# Patient Record
Sex: FEMALE | Race: WHITE | NOT HISPANIC OR LATINO | Employment: UNEMPLOYED | URBAN - METROPOLITAN AREA
[De-identification: names, ages, dates, MRNs, and addresses within clinical notes are randomized per-mention and may not be internally consistent; named-entity substitution may affect disease eponyms.]

---

## 2022-10-25 ENCOUNTER — APPOINTMENT (OUTPATIENT)
Dept: GENERAL RADIOLOGY | Facility: HOSPITAL | Age: 27
End: 2022-10-25

## 2022-10-25 ENCOUNTER — HOSPITAL ENCOUNTER (EMERGENCY)
Facility: HOSPITAL | Age: 27
Discharge: HOME | End: 2022-10-26
Attending: EMERGENCY MEDICINE | Admitting: EMERGENCY MEDICINE

## 2022-10-25 DIAGNOSIS — N39.0 ACUTE UTI: ICD-10-CM

## 2022-10-25 DIAGNOSIS — R07.9 CHEST PAIN, UNSPECIFIED TYPE: Primary | ICD-10-CM

## 2022-10-25 LAB
B-HCG UR QL: NEGATIVE
BASOPHILS # BLD AUTO: 0 10*3/MM3 (ref 0–0.2)
BASOPHILS NFR BLD AUTO: 0.4 % (ref 0–1.5)
DEPRECATED RDW RBC AUTO: 39.8 FL (ref 37–54)
EOSINOPHIL # BLD AUTO: 0.1 10*3/MM3 (ref 0–0.4)
EOSINOPHIL NFR BLD AUTO: 1.3 % (ref 0.3–6.2)
ERYTHROCYTE [DISTWIDTH] IN BLOOD BY AUTOMATED COUNT: 12.6 % (ref 12.3–15.4)
HCT VFR BLD AUTO: 38.7 % (ref 34–46.6)
HGB BLD-MCNC: 12.8 G/DL (ref 12–15.9)
LYMPHOCYTES # BLD AUTO: 2.5 10*3/MM3 (ref 0.7–3.1)
LYMPHOCYTES NFR BLD AUTO: 30.6 % (ref 19.6–45.3)
MCH RBC QN AUTO: 30.1 PG (ref 26.6–33)
MCHC RBC AUTO-ENTMCNC: 33.2 G/DL (ref 31.5–35.7)
MCV RBC AUTO: 90.6 FL (ref 79–97)
MONOCYTES # BLD AUTO: 0.7 10*3/MM3 (ref 0.1–0.9)
MONOCYTES NFR BLD AUTO: 8.6 % (ref 5–12)
NEUTROPHILS NFR BLD AUTO: 4.9 10*3/MM3 (ref 1.7–7)
NEUTROPHILS NFR BLD AUTO: 59.1 % (ref 42.7–76)
NRBC BLD AUTO-RTO: 0.1 /100 WBC (ref 0–0.2)
PLATELET # BLD AUTO: 259 10*3/MM3 (ref 140–450)
PMV BLD AUTO: 8.4 FL (ref 6–12)
RBC # BLD AUTO: 4.27 10*6/MM3 (ref 3.77–5.28)
WBC NRBC COR # BLD: 8.3 10*3/MM3 (ref 3.4–10.8)

## 2022-10-25 PROCEDURE — 81025 URINE PREGNANCY TEST: CPT | Performed by: NURSE PRACTITIONER

## 2022-10-25 PROCEDURE — 99284 EMERGENCY DEPT VISIT MOD MDM: CPT

## 2022-10-25 PROCEDURE — 84484 ASSAY OF TROPONIN QUANT: CPT | Performed by: NURSE PRACTITIONER

## 2022-10-25 PROCEDURE — 36415 COLL VENOUS BLD VENIPUNCTURE: CPT

## 2022-10-25 PROCEDURE — 93005 ELECTROCARDIOGRAM TRACING: CPT | Performed by: EMERGENCY MEDICINE

## 2022-10-25 PROCEDURE — 85025 COMPLETE CBC W/AUTO DIFF WBC: CPT | Performed by: NURSE PRACTITIONER

## 2022-10-25 PROCEDURE — 71045 X-RAY EXAM CHEST 1 VIEW: CPT

## 2022-10-25 PROCEDURE — 81001 URINALYSIS AUTO W/SCOPE: CPT | Performed by: NURSE PRACTITIONER

## 2022-10-25 PROCEDURE — 93005 ELECTROCARDIOGRAM TRACING: CPT

## 2022-10-25 PROCEDURE — 87086 URINE CULTURE/COLONY COUNT: CPT | Performed by: NURSE PRACTITIONER

## 2022-10-25 PROCEDURE — 83880 ASSAY OF NATRIURETIC PEPTIDE: CPT | Performed by: NURSE PRACTITIONER

## 2022-10-25 PROCEDURE — 80053 COMPREHEN METABOLIC PANEL: CPT | Performed by: NURSE PRACTITIONER

## 2022-10-25 RX ORDER — SODIUM CHLORIDE 0.9 % (FLUSH) 0.9 %
10 SYRINGE (ML) INJECTION AS NEEDED
Status: DISCONTINUED | OUTPATIENT
Start: 2022-10-25 | End: 2022-10-26 | Stop reason: HOSPADM

## 2022-10-26 VITALS
TEMPERATURE: 98 F | RESPIRATION RATE: 18 BRPM | SYSTOLIC BLOOD PRESSURE: 124 MMHG | HEART RATE: 67 BPM | OXYGEN SATURATION: 100 % | BODY MASS INDEX: 29.1 KG/M2 | WEIGHT: 185.41 LBS | HEIGHT: 67 IN | DIASTOLIC BLOOD PRESSURE: 94 MMHG

## 2022-10-26 LAB
ALBUMIN SERPL-MCNC: 3.9 G/DL (ref 3.5–5.2)
ALBUMIN/GLOB SERPL: 1.4 G/DL
ALP SERPL-CCNC: 78 U/L (ref 39–117)
ALT SERPL W P-5'-P-CCNC: 9 U/L (ref 1–33)
ANION GAP SERPL CALCULATED.3IONS-SCNC: 10 MMOL/L (ref 5–15)
AST SERPL-CCNC: 11 U/L (ref 1–32)
BACTERIA UR QL AUTO: ABNORMAL /HPF
BILIRUB SERPL-MCNC: 0.2 MG/DL (ref 0–1.2)
BILIRUB UR QL STRIP: NEGATIVE
BUN SERPL-MCNC: 10 MG/DL (ref 6–20)
BUN/CREAT SERPL: 14.9 (ref 7–25)
CALCIUM SPEC-SCNC: 10.1 MG/DL (ref 8.6–10.5)
CHLORIDE SERPL-SCNC: 105 MMOL/L (ref 98–107)
CLARITY UR: CLEAR
CO2 SERPL-SCNC: 27 MMOL/L (ref 22–29)
COLOR UR: YELLOW
CREAT SERPL-MCNC: 0.67 MG/DL (ref 0.57–1)
EGFRCR SERPLBLD CKD-EPI 2021: 123.8 ML/MIN/1.73
GLOBULIN UR ELPH-MCNC: 2.7 GM/DL
GLUCOSE SERPL-MCNC: 91 MG/DL (ref 65–99)
GLUCOSE UR STRIP-MCNC: NEGATIVE MG/DL
HGB UR QL STRIP.AUTO: NEGATIVE
HYALINE CASTS UR QL AUTO: ABNORMAL /LPF
KETONES UR QL STRIP: NEGATIVE
LEUKOCYTE ESTERASE UR QL STRIP.AUTO: ABNORMAL
NITRITE UR QL STRIP: NEGATIVE
NT-PROBNP SERPL-MCNC: 34.7 PG/ML (ref 0–450)
PH UR STRIP.AUTO: 6.5 [PH] (ref 5–8)
POTASSIUM SERPL-SCNC: 3.8 MMOL/L (ref 3.5–5.2)
PROT SERPL-MCNC: 6.6 G/DL (ref 6–8.5)
PROT UR QL STRIP: NEGATIVE
RBC # UR STRIP: ABNORMAL /HPF
REF LAB TEST METHOD: ABNORMAL
SODIUM SERPL-SCNC: 142 MMOL/L (ref 136–145)
SP GR UR STRIP: 1.02 (ref 1–1.03)
SQUAMOUS #/AREA URNS HPF: ABNORMAL /HPF
TROPONIN T SERPL-MCNC: <0.01 NG/ML (ref 0–0.03)
UROBILINOGEN UR QL STRIP: ABNORMAL
WBC # UR STRIP: ABNORMAL /HPF

## 2022-10-26 RX ORDER — NAPROXEN 500 MG/1
500 TABLET ORAL 2 TIMES DAILY PRN
Qty: 10 TABLET | Refills: 0 | Status: SHIPPED | OUTPATIENT
Start: 2022-10-26 | End: 2023-02-06

## 2022-10-26 RX ORDER — CEFDINIR 300 MG/1
300 CAPSULE ORAL 2 TIMES DAILY
Qty: 10 CAPSULE | Refills: 0 | Status: SHIPPED | OUTPATIENT
Start: 2022-10-26 | End: 2022-10-31

## 2022-10-27 LAB — BACTERIA SPEC AEROBE CULT: NO GROWTH

## 2022-10-28 LAB — QT INTERVAL: 407 MS

## 2023-02-06 ENCOUNTER — OFFICE VISIT (OUTPATIENT)
Dept: FAMILY MEDICINE CLINIC | Age: 28
End: 2023-02-06
Payer: MEDICAID

## 2023-02-06 VITALS
RESPIRATION RATE: 16 BRPM | TEMPERATURE: 97.5 F | DIASTOLIC BLOOD PRESSURE: 107 MMHG | BODY MASS INDEX: 29.6 KG/M2 | HEART RATE: 87 BPM | WEIGHT: 188.6 LBS | HEIGHT: 67 IN | SYSTOLIC BLOOD PRESSURE: 139 MMHG | OXYGEN SATURATION: 99 %

## 2023-02-06 DIAGNOSIS — Z79.899 CHRONIC PRESCRIPTION BENZODIAZEPINE USE: ICD-10-CM

## 2023-02-06 DIAGNOSIS — F41.1 GENERALIZED ANXIETY DISORDER WITH PANIC ATTACKS: ICD-10-CM

## 2023-02-06 DIAGNOSIS — M79.604 BILATERAL LEG PAIN: ICD-10-CM

## 2023-02-06 DIAGNOSIS — E55.9 VITAMIN D DEFICIENCY: ICD-10-CM

## 2023-02-06 DIAGNOSIS — M79.605 BILATERAL LEG PAIN: ICD-10-CM

## 2023-02-06 DIAGNOSIS — M54.42 CHRONIC MIDLINE LOW BACK PAIN WITH BILATERAL SCIATICA: ICD-10-CM

## 2023-02-06 DIAGNOSIS — F90.2 ADHD (ATTENTION DEFICIT HYPERACTIVITY DISORDER), COMBINED TYPE: ICD-10-CM

## 2023-02-06 DIAGNOSIS — N39.0 ACUTE UTI: ICD-10-CM

## 2023-02-06 DIAGNOSIS — Z79.899 MEDICATION MANAGEMENT: ICD-10-CM

## 2023-02-06 DIAGNOSIS — N92.6 IRREGULAR MENSTRUATION: ICD-10-CM

## 2023-02-06 DIAGNOSIS — E53.8 VITAMIN B12 DEFICIENCY: ICD-10-CM

## 2023-02-06 DIAGNOSIS — G47.9 SLEEP DISTURBANCE: ICD-10-CM

## 2023-02-06 DIAGNOSIS — Z76.89 ENCOUNTER FOR ASSESSMENT OF STD EXPOSURE: ICD-10-CM

## 2023-02-06 DIAGNOSIS — F41.8 DEPRESSION WITH ANXIETY: ICD-10-CM

## 2023-02-06 DIAGNOSIS — Z00.00 ENCOUNTER FOR MEDICAL EXAMINATION TO ESTABLISH CARE: Primary | ICD-10-CM

## 2023-02-06 DIAGNOSIS — F41.0 GENERALIZED ANXIETY DISORDER WITH PANIC ATTACKS: ICD-10-CM

## 2023-02-06 DIAGNOSIS — R30.0 DYSURIA: ICD-10-CM

## 2023-02-06 DIAGNOSIS — D50.8 IRON DEFICIENCY ANEMIA SECONDARY TO INADEQUATE DIETARY IRON INTAKE: ICD-10-CM

## 2023-02-06 DIAGNOSIS — B37.31 VAGINAL YEAST INFECTION: ICD-10-CM

## 2023-02-06 DIAGNOSIS — Z11.59 NEED FOR HEPATITIS C SCREENING TEST: ICD-10-CM

## 2023-02-06 DIAGNOSIS — Z76.0 MEDICATION REFILL: ICD-10-CM

## 2023-02-06 DIAGNOSIS — G89.29 CHRONIC MIDLINE LOW BACK PAIN WITH BILATERAL SCIATICA: ICD-10-CM

## 2023-02-06 DIAGNOSIS — Z79.899 ENCOUNTER FOR MEDICATION REVIEW: ICD-10-CM

## 2023-02-06 DIAGNOSIS — R03.0 ELEVATED BLOOD PRESSURE READING WITHOUT DIAGNOSIS OF HYPERTENSION: ICD-10-CM

## 2023-02-06 DIAGNOSIS — M54.41 CHRONIC MIDLINE LOW BACK PAIN WITH BILATERAL SCIATICA: ICD-10-CM

## 2023-02-06 PROBLEM — D50.9 IRON DEFICIENCY ANEMIA: Status: ACTIVE | Noted: 2023-02-06

## 2023-02-06 PROBLEM — J45.20 INTERMITTENT ASTHMA: Status: ACTIVE | Noted: 2023-02-06

## 2023-02-06 LAB
B-HCG UR QL: NEGATIVE
BILIRUB BLD-MCNC: NEGATIVE MG/DL
C TRACH RRNA CVX QL NAA+PROBE: NOT DETECTED
CLARITY, POC: ABNORMAL
COLOR UR: YELLOW
EXPIRATION DATE: NORMAL
GLUCOSE UR STRIP-MCNC: NEGATIVE MG/DL
INTERNAL NEGATIVE CONTROL: NORMAL
INTERNAL POSITIVE CONTROL: NORMAL
KETONES UR QL: NEGATIVE
LEUKOCYTE EST, POC: NEGATIVE
Lab: NORMAL
N GONORRHOEA RRNA SPEC QL NAA+PROBE: NOT DETECTED
NITRITE UR-MCNC: NEGATIVE MG/ML
PH UR: 7 [PH] (ref 5–8)
PROT UR STRIP-MCNC: NEGATIVE MG/DL
RBC # UR STRIP: NEGATIVE /UL
SP GR UR: 1.01 (ref 1–1.03)
UROBILINOGEN UR QL: ABNORMAL

## 2023-02-06 PROCEDURE — 99204 OFFICE O/P NEW MOD 45 MIN: CPT | Performed by: NURSE PRACTITIONER

## 2023-02-06 PROCEDURE — 81025 URINE PREGNANCY TEST: CPT | Performed by: NURSE PRACTITIONER

## 2023-02-06 PROCEDURE — 87661 TRICHOMONAS VAGINALIS AMPLIF: CPT | Performed by: NURSE PRACTITIONER

## 2023-02-06 PROCEDURE — 87591 N.GONORRHOEAE DNA AMP PROB: CPT | Performed by: NURSE PRACTITIONER

## 2023-02-06 PROCEDURE — 87491 CHLMYD TRACH DNA AMP PROBE: CPT | Performed by: NURSE PRACTITIONER

## 2023-02-06 PROCEDURE — 87086 URINE CULTURE/COLONY COUNT: CPT | Performed by: NURSE PRACTITIONER

## 2023-02-06 RX ORDER — LISDEXAMFETAMINE DIMESYLATE 50 MG
50 CAPSULE ORAL EVERY MORNING
COMMUNITY
Start: 2023-01-11 | End: 2023-02-09 | Stop reason: SDUPTHER

## 2023-02-06 RX ORDER — SULFAMETHOXAZOLE AND TRIMETHOPRIM 800; 160 MG/1; MG/1
1 TABLET ORAL 2 TIMES DAILY
Qty: 10 TABLET | Refills: 0 | Status: SHIPPED | OUTPATIENT
Start: 2023-02-06 | End: 2023-02-06 | Stop reason: SDUPTHER

## 2023-02-06 RX ORDER — ALPRAZOLAM 0.5 MG/1
1 TABLET ORAL EVERY 12 HOURS SCHEDULED
COMMUNITY
Start: 2023-01-11 | End: 2023-02-09 | Stop reason: SDUPTHER

## 2023-02-06 RX ORDER — HYDROCODONE BITARTRATE AND ACETAMINOPHEN 5; 325 MG/1; MG/1
1 TABLET ORAL EVERY 6 HOURS PRN
COMMUNITY
Start: 2023-02-01 | End: 2023-03-06

## 2023-02-06 RX ORDER — FOLIC ACID 1 MG/1
1 TABLET ORAL DAILY
COMMUNITY
Start: 2022-12-19 | End: 2023-03-06 | Stop reason: SDUPTHER

## 2023-02-06 RX ORDER — MONTELUKAST SODIUM 10 MG/1
1 TABLET ORAL DAILY
COMMUNITY
Start: 2022-12-19 | End: 2023-03-06 | Stop reason: SDUPTHER

## 2023-02-06 RX ORDER — AMOXICILLIN 500 MG/1
1 CAPSULE ORAL 3 TIMES DAILY
COMMUNITY
Start: 2023-02-01 | End: 2023-03-06

## 2023-02-06 RX ORDER — FLUCONAZOLE 150 MG/1
TABLET ORAL
Qty: 2 TABLET | Refills: 0 | Status: SHIPPED | OUTPATIENT
Start: 2023-02-06 | End: 2023-03-06

## 2023-02-06 RX ORDER — LANOLIN ALCOHOL/MO/W.PET/CERES
1 CREAM (GRAM) TOPICAL EVERY 12 HOURS SCHEDULED
COMMUNITY
Start: 2022-12-19 | End: 2023-03-06 | Stop reason: SDUPTHER

## 2023-02-06 RX ORDER — ESCITALOPRAM OXALATE 5 MG/1
5 TABLET ORAL DAILY
Qty: 30 TABLET | Refills: 0 | Status: SHIPPED | OUTPATIENT
Start: 2023-02-06 | End: 2023-03-06 | Stop reason: SDUPTHER

## 2023-02-06 RX ORDER — CHOLECALCIFEROL (VITAMIN D3) 1250 MCG
CAPSULE ORAL
COMMUNITY
Start: 2022-12-19 | End: 2023-02-09 | Stop reason: SDUPTHER

## 2023-02-06 RX ORDER — IBUPROFEN 800 MG/1
800 TABLET ORAL EVERY 8 HOURS PRN
COMMUNITY
Start: 2023-02-01 | End: 2023-03-06

## 2023-02-06 RX ORDER — ALBUTEROL SULFATE 90 UG/1
2 AEROSOL, METERED RESPIRATORY (INHALATION) EVERY 4 HOURS PRN
Qty: 18 G | Refills: 2 | Status: SHIPPED | OUTPATIENT
Start: 2023-02-06

## 2023-02-06 RX ORDER — LAMOTRIGINE 25 MG/1
1 TABLET ORAL EVERY 12 HOURS SCHEDULED
COMMUNITY
Start: 2022-12-19

## 2023-02-06 RX ORDER — CYCLOBENZAPRINE HCL 10 MG
10 TABLET ORAL 3 TIMES DAILY
COMMUNITY
Start: 2022-12-19

## 2023-02-06 RX ORDER — ALBUTEROL SULFATE 90 UG/1
AEROSOL, METERED RESPIRATORY (INHALATION)
COMMUNITY
Start: 2022-12-19 | End: 2023-02-06 | Stop reason: SDUPTHER

## 2023-02-06 RX ORDER — HYDROXYZINE HYDROCHLORIDE 25 MG/1
25 TABLET, FILM COATED ORAL NIGHTLY PRN
Qty: 30 TABLET | Refills: 0 | Status: SHIPPED | OUTPATIENT
Start: 2023-02-06 | End: 2023-03-06 | Stop reason: SDUPTHER

## 2023-02-06 NOTE — PROGRESS NOTES
Deanna Bah  [id number]  1995  female     02/06/2023      Chief Complaint  Establish Care (Depression /Hasnt had blood work in a long time, pt is not fasting ) and Difficulty Urinating    History of Present Illness  27 year old female patient presents today to establish care.  Patient complains of dysuria and occasional white vaginal discharge x1 week.  Patient states she was placed on an amoxicillin antibiotic for an infected tooth roughly 1 week ago and that her vaginal discharge started getting worse after starting Amoxil antibiotic.  Patient states she has a history of a vaginal yeast infection and contributes this to that.  Patient states she would like to be STD tested for potential STD exposure.  Patient states she was last sexually active 4 days ago with the same male partner.  Patient denies fever, chills, body aches, abdominal pain, NVD, pelvic pain, hematuria, urinary frequency, urinary urgency, flank pain, back pain.  Patient states she contributes her blood pressure elevation due to her anxiety.  Patient is here to discuss her depression.  Patient states she does not have trouble falling asleep but has been having trouble staying asleep.  Patient states she is on Xanax 0.5 mg twice daily.  Reviewed with patient [place] policy on controlled substances such as her Xanax and that she will have to come in for refills.  Reviewed patient's PHQ-2/PHQ-9 and patient denies suicidal ideation or intent of self-harm at this time.  Patient denies agitation, hallucinations, delusions.  Patient states she has been on Vyvanse for her ADHD.  Patient states she has history of intermittent asthma where she uses her albuterol inhaler, patient states she needs a med refill on this.  Patient states she is on vitamin D for vitamin D deficiency and has a history of vitamin B12 deficiency.  Patient states she was prescribed Norco's as needed for her chronic midline low back pain with sciatica but denies lumbar  imaging.  Patient states she has not been taking Norco due to it causing her to have an upset stomach/nausea.  Patient states she takes folic acid and ferrous sulfate for iron deficiency anemia.  Patient agrees to nurse/MA visit to come in fasting for labs 1 day this week.  Patient agrees to try Lexapro and hydroxyzine as needed for her anxiety and sleep problems and to follow-up in 1 month for recheck.  Patient agrees to lumbar x-ray and pain management referral for chronic pain management and medication management.  Patient also agrees to behavioral health referral to [facility] for her anxiety, depression, ADHD management.      Review of Systems   Constitutional: Negative.    HENT: Negative.    Eyes: Negative.    Respiratory: Negative.    Cardiovascular: Negative.    Gastrointestinal: Negative.    Endocrine: Negative.    Genitourinary: Positive for dysuria and vaginal discharge (occasionally white). Negative for decreased urine volume, difficulty urinating, dyspareunia, enuresis, flank pain, frequency, genital sores, hematuria, menstrual problem, pelvic pain, urgency, vaginal bleeding and vaginal pain.   Musculoskeletal: Negative.    Skin: Negative.    Allergic/Immunologic: Negative.    Neurological: Negative.    Hematological: Negative.    Psychiatric/Behavioral: Negative.        Past Medical History:   Diagnosis Date   • Anxiety    • Asthma    • Bipolar 1 disorder (HCC)    • Hypertension    • Lordoscoliosis        Past Surgical History:   Procedure Laterality Date   • KIDNEY STONE SURGERY     • MOUTH SURGERY         Family History   Problem Relation Age of Onset   • Hypertension Mother    • Diabetes Mother    • Liver cancer Father    • Diabetes Sister    • Liver cancer Maternal Grandmother    • Aneurysm Maternal Grandfather    • Breast cancer Paternal Grandmother        Social History     Socioeconomic History   • Marital status: Single   Tobacco Use   • Smoking status: Never   • Smokeless tobacco: Never  "  Vaping Use   • Vaping Use: Every day   • Substances: Flavoring   • Devices: Disposable   Substance and Sexual Activity   • Alcohol use: Never   • Sexual activity: Yes     Partners: Male        Allergies   Allergen Reactions   • Latex Rash         Objective   Vital Signs:   BP (!) 139/107 (BP Location: Left arm, Patient Position: Sitting, Cuff Size: Adult)   Pulse 87   Temp 97.5 °F (36.4 °C) (Temporal)   Resp 16   Ht 170.2 cm (67\")   Wt 85.5 kg (188 lb 9.6 oz)   SpO2 99%   BMI 29.54 kg/m²       Physical Exam  Vitals and nursing note reviewed.   Constitutional:       General: She is not in acute distress.     Appearance: Normal appearance. She is not ill-appearing, toxic-appearing or diaphoretic.   HENT:      Head: Normocephalic and atraumatic.      Jaw: There is normal jaw occlusion.      Right Ear: Hearing and external ear normal.      Left Ear: Hearing and external ear normal.      Nose: Nose normal.      Mouth/Throat:      Lips: Pink.   Eyes:      General: Lids are normal. Vision grossly intact. Gaze aligned appropriately.      Extraocular Movements: Extraocular movements intact.      Conjunctiva/sclera: Conjunctivae normal.      Pupils: Pupils are equal, round, and reactive to light.   Cardiovascular:      Rate and Rhythm: Normal rate and regular rhythm.      Pulses: Normal pulses.           Carotid pulses are 2+ on the right side and 2+ on the left side.       Radial pulses are 2+ on the right side and 2+ on the left side.        Dorsalis pedis pulses are 2+ on the right side and 2+ on the left side.        Posterior tibial pulses are 2+ on the right side and 2+ on the left side.      Heart sounds: Normal heart sounds, S1 normal and S2 normal. No murmur heard.  Pulmonary:      Effort: Pulmonary effort is normal.      Breath sounds: Normal breath sounds and air entry.   Abdominal:      General: Abdomen is flat. Bowel sounds are normal. There is no distension or abdominal bruit.      Palpations: Abdomen " is soft.      Tenderness: There is no abdominal tenderness.   Musculoskeletal:         General: Normal range of motion.      Cervical back: Full passive range of motion without pain, normal range of motion and neck supple.      Right lower leg: No edema.      Left lower leg: No edema.   Skin:     General: Skin is warm and dry.      Capillary Refill: Capillary refill takes less than 2 seconds.      Coloration: Skin is not cyanotic or pale.      Findings: No bruising, erythema or rash.   Neurological:      General: No focal deficit present.      Mental Status: She is alert and oriented to person, place, and time. Mental status is at baseline.      GCS: GCS eye subscore is 4. GCS verbal subscore is 5. GCS motor subscore is 6.      Cranial Nerves: Cranial nerves 2-12 are intact. No cranial nerve deficit.      Sensory: Sensation is intact. No sensory deficit.      Motor: Motor function is intact. No weakness.      Coordination: Coordination is intact. Coordination normal.      Gait: Gait is intact. Gait normal.      Deep Tendon Reflexes: Reflexes normal.      Comments: Alert and oriented x3, no acute distress.  Answers questions appropriately and in a timely manner.   Psychiatric:         Attention and Perception: Attention and perception normal.         Mood and Affect: Mood and affect normal.         Speech: Speech normal.         Behavior: Behavior normal. Behavior is cooperative.         Thought Content: Thought content normal.         Cognition and Memory: Cognition and memory normal.         Judgment: Judgment normal.                 Assessment and Plan   Diagnoses and all orders for this visit:    1. Encounter for medical examination to establish care (Primary)  -     Vitamin B12; Future  -     Vitamin D 25 hydroxy; Future  -     CBC w AUTO Differential; Future  -     Comprehensive metabolic panel; Future  -     TSH; Future  -     Hepatitis C antibody; Future    2. Dysuria  -     POCT urinalysis dipstick,  multipro  -     Urine Culture - Urine, Urine, Clean Catch  -     Chlamydia trachomatis, Neisseria gonorrhoeae, PCR - Urine, Urine, Random Void  -     Trichomonas vaginalis, PCR - Urine, Urine, Clean Catch  -     POCT pregnancy, urine    3. Encounter for assessment of STD exposure  -     Chlamydia trachomatis, Neisseria gonorrhoeae, PCR - Urine, Urine, Random Void  -     Trichomonas vaginalis, PCR - Urine, Urine, Clean Catch    4. Irregular menstruation  -     Ambulatory Referral to Obstetrics / Gynecology  -     POCT pregnancy, urine    5. Medication refill  -     albuterol sulfate  (90 Base) MCG/ACT inhaler; Inhale 2 puffs Every 4 (Four) Hours As Needed for Wheezing.  Dispense: 18 g; Refill: 2    6. Chronic midline low back pain with bilateral sciatica  -     XR Spine Lumbar 2 or 3 View; Future  -     POCT pregnancy, urine  -     Ambulatory Referral to Pain Management    7. Bilateral leg pain  -     Ambulatory Referral to Pain Management    8. Medication management  -     Ambulatory Referral to Pain Management  -     Ambulatory Referral to Behavioral Health    9. Chronic prescription benzodiazepine use  -     Ambulatory Referral to Behavioral Health    10. Generalized anxiety disorder with panic attacks  -     Ambulatory Referral to Behavioral Health  -     escitalopram (Lexapro) 5 MG tablet; Take 1 tablet by mouth Daily.  Dispense: 30 tablet; Refill: 0  -     hydrOXYzine (ATARAX) 25 MG tablet; Take 1 tablet by mouth At Night As Needed for Anxiety for up to 30 days.  Dispense: 30 tablet; Refill: 0    11. Depression with anxiety  -     Ambulatory Referral to Behavioral Health  -     escitalopram (Lexapro) 5 MG tablet; Take 1 tablet by mouth Daily.  Dispense: 30 tablet; Refill: 0  -     TSH; Future    12. ADHD (attention deficit hyperactivity disorder), combined type  -     Ambulatory Referral to Behavioral Health    13. Sleep disturbance  -     hydrOXYzine (ATARAX) 25 MG tablet; Take 1 tablet by mouth At  Night As Needed for Anxiety for up to 30 days.  Dispense: 30 tablet; Refill: 0    14. Acute UTI  Comments:  Patient currently on Amoxicillin ABX for tooth infection. Pending urine culture.   Orders:  -     Discontinue: sulfamethoxazole-trimethoprim (Bactrim DS) 800-160 MG per tablet; Take 1 tablet by mouth 2 (Two) Times a Day for 5 days.  Dispense: 10 tablet; Refill: 0    15. Elevated blood pressure reading without diagnosis of hypertension  Comments:  Patient contributes this to her anxiety.     16. Vaginal yeast infection  -     fluconazole (Diflucan) 150 MG tablet; X1 today, repeat in 72 hours x 1 if needed  Dispense: 2 tablet; Refill: 0    17. Need for hepatitis C screening test  -     Hepatitis C antibody; Future    18. Vitamin D deficiency  -     Vitamin D 25 hydroxy; Future    19. Vitamin B12 deficiency  -     Vitamin B12; Future    20. Iron deficiency anemia secondary to inadequate dietary iron intake  -     CBC w AUTO Differential; Future    21. Encounter for medication review        Follow Up   Return in about 1 month (around 3/6/2023) for Recheck.    There are no Patient Instructions on file for this visit.    none

## 2023-02-07 LAB
BACTERIA SPEC AEROBE CULT: NO GROWTH
T VAGINALIS RRNA SPEC QL NAA+PROBE: NEGATIVE

## 2023-02-08 ENCOUNTER — CLINICAL SUPPORT (OUTPATIENT)
Dept: FAMILY MEDICINE CLINIC | Age: 28
End: 2023-02-08
Payer: MEDICAID

## 2023-02-08 ENCOUNTER — TELEPHONE (OUTPATIENT)
Dept: FAMILY MEDICINE CLINIC | Age: 28
End: 2023-02-08
Payer: MEDICAID

## 2023-02-08 DIAGNOSIS — D50.8 IRON DEFICIENCY ANEMIA SECONDARY TO INADEQUATE DIETARY IRON INTAKE: ICD-10-CM

## 2023-02-08 DIAGNOSIS — Z11.59 NEED FOR HEPATITIS C SCREENING TEST: ICD-10-CM

## 2023-02-08 DIAGNOSIS — E53.8 VITAMIN B12 DEFICIENCY: ICD-10-CM

## 2023-02-08 DIAGNOSIS — F41.8 DEPRESSION WITH ANXIETY: ICD-10-CM

## 2023-02-08 DIAGNOSIS — Z00.00 ENCOUNTER FOR MEDICAL EXAMINATION TO ESTABLISH CARE: ICD-10-CM

## 2023-02-08 DIAGNOSIS — E55.9 VITAMIN D DEFICIENCY: ICD-10-CM

## 2023-02-08 PROCEDURE — 80050 GENERAL HEALTH PANEL: CPT | Performed by: NURSE PRACTITIONER

## 2023-02-08 PROCEDURE — 36415 COLL VENOUS BLD VENIPUNCTURE: CPT | Performed by: NURSE PRACTITIONER

## 2023-02-08 PROCEDURE — 82306 VITAMIN D 25 HYDROXY: CPT | Performed by: NURSE PRACTITIONER

## 2023-02-08 PROCEDURE — 82607 VITAMIN B-12: CPT | Performed by: NURSE PRACTITIONER

## 2023-02-08 PROCEDURE — 86803 HEPATITIS C AB TEST: CPT | Performed by: NURSE PRACTITIONER

## 2023-02-08 NOTE — PROGRESS NOTES
Venipuncture Blood Specimen Collection  Venipuncture performed in right arm by Jelly Carcamo MA with good hemostasis. Patient tolerated the procedure well without complications.   02/08/23   Jelly Carcamo MA

## 2023-02-08 NOTE — TELEPHONE ENCOUNTER
"Favian, patient came in to have her lab work done. She inquired about a few things while here. First she asked about her urine lab results, I went over those with her. She voiced understanding.     She also asked if she was supposed to discontinue her other medication since she has started the lexapro 5 mg. I looked in her chart and do not see any active or inactive depression/anxiety meds. I asked her what medication she was taking before and she was not sure. Stated she would go home, check the bottle and give me a call. I did tell her that typically you would not be on two different types of depression medication, but was adimate about her calling me back so that I could confirm. She voiced understanding.     She also said that during her office visit on 2/6- you guys were \"trying to decide how to do the Vyvanse and Xanax together\". I explained that these two medication are counteractive with one being a stimulant and the other a sedative. She said that that she pays for the Xanax out of pocket and her insurance covers the Vyvanse. She wanted me to pass that on to you.    She also asked about her GYN referral and if she would need to call and schedule an appointment with them. I told her that she would indeed need to contact their office. She does not know which office you referred her to. I tried to look at the referral but I could not find any office details besides \"Obsterics and Gynecology\". Please confirm so that I can give her information to the office. Thank you!  "

## 2023-02-09 DIAGNOSIS — F90.2 ADHD (ATTENTION DEFICIT HYPERACTIVITY DISORDER), COMBINED TYPE: Primary | ICD-10-CM

## 2023-02-09 DIAGNOSIS — F41.0 PANIC ATTACKS: ICD-10-CM

## 2023-02-09 DIAGNOSIS — F41.8 DEPRESSION WITH ANXIETY: ICD-10-CM

## 2023-02-09 LAB
25(OH)D3 SERPL-MCNC: 28.5 NG/ML (ref 30–100)
ALBUMIN SERPL-MCNC: 4.5 G/DL (ref 3.5–5.2)
ALBUMIN/GLOB SERPL: 1.6 G/DL
ALP SERPL-CCNC: 74 U/L (ref 39–117)
ALT SERPL W P-5'-P-CCNC: 16 U/L (ref 1–33)
ANION GAP SERPL CALCULATED.3IONS-SCNC: 10.6 MMOL/L (ref 5–15)
AST SERPL-CCNC: 15 U/L (ref 1–32)
BASOPHILS # BLD AUTO: 0.03 10*3/MM3 (ref 0–0.2)
BASOPHILS NFR BLD AUTO: 0.4 % (ref 0–1.5)
BILIRUB SERPL-MCNC: 0.4 MG/DL (ref 0–1.2)
BUN SERPL-MCNC: 10 MG/DL (ref 6–20)
BUN/CREAT SERPL: 14.3 (ref 7–25)
CALCIUM SPEC-SCNC: 9.9 MG/DL (ref 8.6–10.5)
CHLORIDE SERPL-SCNC: 101 MMOL/L (ref 98–107)
CO2 SERPL-SCNC: 26.4 MMOL/L (ref 22–29)
CREAT SERPL-MCNC: 0.7 MG/DL (ref 0.57–1)
DEPRECATED RDW RBC AUTO: 40.3 FL (ref 37–54)
EGFRCR SERPLBLD CKD-EPI 2021: 122.5 ML/MIN/1.73
EOSINOPHIL # BLD AUTO: 0.14 10*3/MM3 (ref 0–0.4)
EOSINOPHIL NFR BLD AUTO: 2 % (ref 0.3–6.2)
ERYTHROCYTE [DISTWIDTH] IN BLOOD BY AUTOMATED COUNT: 11.8 % (ref 12.3–15.4)
GLOBULIN UR ELPH-MCNC: 2.9 GM/DL
GLUCOSE SERPL-MCNC: 87 MG/DL (ref 65–99)
HCT VFR BLD AUTO: 43.4 % (ref 34–46.6)
HCV AB SER DONR QL: NORMAL
HGB BLD-MCNC: 14.5 G/DL (ref 12–15.9)
IMM GRANULOCYTES # BLD AUTO: 0.01 10*3/MM3 (ref 0–0.05)
IMM GRANULOCYTES NFR BLD AUTO: 0.1 % (ref 0–0.5)
LYMPHOCYTES # BLD AUTO: 1.75 10*3/MM3 (ref 0.7–3.1)
LYMPHOCYTES NFR BLD AUTO: 24.7 % (ref 19.6–45.3)
MCH RBC QN AUTO: 30.9 PG (ref 26.6–33)
MCHC RBC AUTO-ENTMCNC: 33.4 G/DL (ref 31.5–35.7)
MCV RBC AUTO: 92.3 FL (ref 79–97)
MONOCYTES # BLD AUTO: 0.64 10*3/MM3 (ref 0.1–0.9)
MONOCYTES NFR BLD AUTO: 9 % (ref 5–12)
NEUTROPHILS NFR BLD AUTO: 4.51 10*3/MM3 (ref 1.7–7)
NEUTROPHILS NFR BLD AUTO: 63.8 % (ref 42.7–76)
NRBC BLD AUTO-RTO: 0 /100 WBC (ref 0–0.2)
PLATELET # BLD AUTO: 315 10*3/MM3 (ref 140–450)
PMV BLD AUTO: 10.3 FL (ref 6–12)
POTASSIUM SERPL-SCNC: 4.2 MMOL/L (ref 3.5–5.2)
PROT SERPL-MCNC: 7.4 G/DL (ref 6–8.5)
RBC # BLD AUTO: 4.7 10*6/MM3 (ref 3.77–5.28)
SODIUM SERPL-SCNC: 138 MMOL/L (ref 136–145)
TSH SERPL DL<=0.05 MIU/L-ACNC: 0.98 UIU/ML (ref 0.27–4.2)
VIT B12 BLD-MCNC: 252 PG/ML (ref 211–946)
WBC NRBC COR # BLD: 7.08 10*3/MM3 (ref 3.4–10.8)

## 2023-02-09 RX ORDER — ERGOCALCIFEROL 1.25 MG/1
50000 CAPSULE ORAL 2 TIMES WEEKLY
Qty: 8 CAPSULE | Refills: 0 | Status: SHIPPED | OUTPATIENT
Start: 2023-02-09 | End: 2023-03-07 | Stop reason: DRUGHIGH

## 2023-02-09 RX ORDER — LISDEXAMFETAMINE DIMESYLATE 50 MG
50 CAPSULE ORAL EVERY MORNING
Qty: 4 CAPSULE | Refills: 0 | Status: SHIPPED | OUTPATIENT
Start: 2023-02-09 | End: 2023-02-20 | Stop reason: SDUPTHER

## 2023-02-09 RX ORDER — ALPRAZOLAM 0.5 MG/1
0.5 TABLET ORAL EVERY 12 HOURS SCHEDULED
Qty: 8 TABLET | Refills: 0 | Status: SHIPPED | OUTPATIENT
Start: 2023-02-09 | End: 2023-02-20 | Stop reason: SDUPTHER

## 2023-02-09 RX ORDER — ALPRAZOLAM 0.5 MG/1
0.5 TABLET ORAL EVERY 12 HOURS SCHEDULED
Qty: 30 TABLET | Refills: 0 | Status: CANCELLED | OUTPATIENT
Start: 2023-02-09

## 2023-02-09 RX ORDER — LISDEXAMFETAMINE DIMESYLATE 50 MG
50 CAPSULE ORAL EVERY MORNING
Qty: 30 CAPSULE | Refills: 0 | Status: CANCELLED | OUTPATIENT
Start: 2023-02-09

## 2023-02-09 NOTE — TELEPHONE ENCOUNTER
Rx Refill Note  Requested Prescriptions      No prescriptions requested or ordered in this encounter      Last office visit with prescribing clinician: 2/6/2023   Last telemedicine visit with prescribing clinician: 3/6/2023   Next office visit with prescribing clinician: 3/6/2023       INSPECT - SCAN - INSPECT/ [facility]/ 02.09.2023 (02/09/2023)                      Would you like a call back once the refill request has been completed: [] Yes [] No    If the office needs to give you a call back, can they leave a voicemail: [] Yes [] No    Ruby Upton LPN  02/09/23, 16:44 EST

## 2023-02-09 NOTE — TELEPHONE ENCOUNTER
Please call and notify patient that she was prescribed 4 days worth of her Vyvanse and Xanax to cover her for the weekend.  Please notify patient that she will need to come in either tomorrow or first thing Monday to sign a patient controlled substance agreement form and to leave a UDS. Thanks

## 2023-02-09 NOTE — TELEPHONE ENCOUNTER
Rx Refill Note        Requested Prescriptions     Pending Prescriptions Disp Refills   • ALPRAZolam (XANAX) 0.5 MG tablet 30 tablet 0     Sig: Take 1 tablet by mouth Every 12 (Twelve) Hours.   • Vyvanse 50 MG capsule 30 capsule 0     Sig: Take 1 capsule by mouth Every Morning      Last office visit with prescribing clinician: 2/6/2023      Next office visit with prescribing clinician: 3/6/2023            Marco Forte MA  02/09/23, 16:26 EST

## 2023-02-10 NOTE — TELEPHONE ENCOUNTER
Hub is instructed to read the documentation below to patient    Tried to call patient, no answer. Left a detailed voicemail informing patient that 4 days worth of Vyvanse and Xanax were sent into the pharmacy to last her through the weekend. Informed that she needs to come in either today (Friday) or Monday to sign her controlled substance agreement and perform a urine drug screen.

## 2023-02-14 NOTE — TELEPHONE ENCOUNTER
Caller: Deanna Bah I    Relationship: Self    Best call back number: [phone]    What was the call regarding: HUB RELAYED MESSAGE TO PATIENT. PATIENT STATED THAT SHE HAD TO TAKE HER MOTHER TO THE ER YESTERDAY SO SHE WAS UNABLE TO COME IN. PATIENT STATED THAT SHE CAN COME IN TODAY. PLEASE ADVISE.    Do you require a callback: YES

## 2023-02-14 NOTE — TELEPHONE ENCOUNTER
Called and spoke to patient. She is coming in today to sign controlled substance agreement and perform UDS

## 2023-02-20 DIAGNOSIS — F41.0 PANIC ATTACKS: ICD-10-CM

## 2023-02-20 DIAGNOSIS — F90.2 ADHD (ATTENTION DEFICIT HYPERACTIVITY DISORDER), COMBINED TYPE: ICD-10-CM

## 2023-02-20 RX ORDER — ALPRAZOLAM 0.5 MG/1
0.5 TABLET ORAL EVERY 12 HOURS SCHEDULED
Qty: 60 TABLET | Refills: 0 | Status: SHIPPED | OUTPATIENT
Start: 2023-02-20

## 2023-02-20 RX ORDER — LISDEXAMFETAMINE DIMESYLATE 50 MG
50 CAPSULE ORAL EVERY MORNING
Qty: 30 CAPSULE | Refills: 0 | Status: SHIPPED | OUTPATIENT
Start: 2023-02-20

## 2023-02-20 NOTE — TELEPHONE ENCOUNTER
Rx Refill Note    PROTOCOLS NOT MET     Requested Prescriptions     Pending Prescriptions Disp Refills   • ALPRAZolam (XANAX) 0.5 MG tablet 8 tablet 0     Sig: Take 1 tablet by mouth Every 12 (Twelve) Hours.   • Vyvanse 50 MG capsule 4 capsule 0     Sig: Take 1 capsule by mouth Every Morning      Last office visit with prescribing clinician: 2/6/2023      Next office visit with prescribing clinician: 3/6/2023     PUNEET BENSON - INSPECT/[facility]/ 2-20-23 (02/20/2023)         Jelly Carcamo MA  02/20/23, 12:06 EST

## 2023-03-01 ENCOUNTER — HOSPITAL ENCOUNTER (EMERGENCY)
Facility: HOSPITAL | Age: 28
Discharge: HOME | End: 2023-03-01
Attending: EMERGENCY MEDICINE | Admitting: EMERGENCY MEDICINE
Payer: MEDICAID

## 2023-03-01 ENCOUNTER — APPOINTMENT (OUTPATIENT)
Dept: GENERAL RADIOLOGY | Facility: HOSPITAL | Age: 28
End: 2023-03-01
Payer: MEDICAID

## 2023-03-01 ENCOUNTER — APPOINTMENT (OUTPATIENT)
Dept: CT IMAGING | Facility: HOSPITAL | Age: 28
End: 2023-03-01
Payer: MEDICAID

## 2023-03-01 VITALS
RESPIRATION RATE: 18 BRPM | HEIGHT: 69 IN | WEIGHT: 187.17 LBS | SYSTOLIC BLOOD PRESSURE: 128 MMHG | HEART RATE: 76 BPM | OXYGEN SATURATION: 98 % | BODY MASS INDEX: 27.72 KG/M2 | TEMPERATURE: 98 F | DIASTOLIC BLOOD PRESSURE: 72 MMHG

## 2023-03-01 DIAGNOSIS — S39.012A STRAIN OF LUMBAR REGION, INITIAL ENCOUNTER: ICD-10-CM

## 2023-03-01 DIAGNOSIS — S20.219A CONTUSION OF CHEST WALL, UNSPECIFIED LATERALITY, INITIAL ENCOUNTER: ICD-10-CM

## 2023-03-01 DIAGNOSIS — S16.1XXA ACUTE STRAIN OF NECK MUSCLE, INITIAL ENCOUNTER: Primary | ICD-10-CM

## 2023-03-01 PROCEDURE — 72125 CT NECK SPINE W/O DYE: CPT

## 2023-03-01 PROCEDURE — 99282 EMERGENCY DEPT VISIT SF MDM: CPT

## 2023-03-01 PROCEDURE — 72110 X-RAY EXAM L-2 SPINE 4/>VWS: CPT

## 2023-03-01 PROCEDURE — 71046 X-RAY EXAM CHEST 2 VIEWS: CPT

## 2023-03-01 RX ORDER — METHOCARBAMOL 750 MG/1
750 TABLET, FILM COATED ORAL 4 TIMES DAILY PRN
Qty: 12 TABLET | Refills: 0 | Status: SHIPPED | OUTPATIENT
Start: 2023-03-01 | End: 2023-03-06

## 2023-03-01 RX ORDER — MELOXICAM 15 MG/1
15 TABLET ORAL DAILY
Qty: 10 TABLET | Refills: 0 | Status: SHIPPED | OUTPATIENT
Start: 2023-03-01

## 2023-03-01 NOTE — DISCHARGE INSTRUCTIONS
May also use Tylenol for pain.  May use ice then heat to sore area.  Follow-up with primary provider, return new or worsening symptoms
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
## 2023-03-06 ENCOUNTER — OFFICE VISIT (OUTPATIENT)
Dept: FAMILY MEDICINE CLINIC | Age: 28
End: 2023-03-06
Payer: MEDICAID

## 2023-03-06 VITALS
WEIGHT: 185.4 LBS | HEART RATE: 81 BPM | OXYGEN SATURATION: 98 % | SYSTOLIC BLOOD PRESSURE: 103 MMHG | RESPIRATION RATE: 18 BRPM | DIASTOLIC BLOOD PRESSURE: 86 MMHG | BODY MASS INDEX: 27.46 KG/M2 | TEMPERATURE: 97.8 F | HEIGHT: 69 IN

## 2023-03-06 DIAGNOSIS — J45.20 MILD INTERMITTENT ASTHMA WITHOUT COMPLICATION: ICD-10-CM

## 2023-03-06 DIAGNOSIS — F41.1 GENERALIZED ANXIETY DISORDER WITH PANIC ATTACKS: Primary | ICD-10-CM

## 2023-03-06 DIAGNOSIS — E55.9 VITAMIN D DEFICIENCY: ICD-10-CM

## 2023-03-06 DIAGNOSIS — S16.1XXA STRAIN OF NECK MUSCLE, INITIAL ENCOUNTER: ICD-10-CM

## 2023-03-06 DIAGNOSIS — F41.0 GENERALIZED ANXIETY DISORDER WITH PANIC ATTACKS: Primary | ICD-10-CM

## 2023-03-06 DIAGNOSIS — D50.8 IRON DEFICIENCY ANEMIA SECONDARY TO INADEQUATE DIETARY IRON INTAKE: ICD-10-CM

## 2023-03-06 DIAGNOSIS — S39.012A LUMBAR STRAIN, INITIAL ENCOUNTER: ICD-10-CM
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
## 2023-03-06 DIAGNOSIS — Z76.0 MEDICATION REFILL: ICD-10-CM

## 2023-03-06 DIAGNOSIS — F41.8 DEPRESSION WITH ANXIETY: ICD-10-CM

## 2023-03-06 PROCEDURE — 83540 ASSAY OF IRON: CPT | Performed by: NURSE PRACTITIONER

## 2023-03-06 PROCEDURE — T1015 CLINIC SERVICE: HCPCS | Performed by: NURSE PRACTITIONER

## 2023-03-06 PROCEDURE — 99214 OFFICE O/P EST MOD 30 MIN: CPT | Performed by: NURSE PRACTITIONER

## 2023-03-06 PROCEDURE — 82728 ASSAY OF FERRITIN: CPT | Performed by: NURSE PRACTITIONER

## 2023-03-06 PROCEDURE — 82306 VITAMIN D 25 HYDROXY: CPT | Performed by: NURSE PRACTITIONER

## 2023-03-06 PROCEDURE — 84466 ASSAY OF TRANSFERRIN: CPT | Performed by: NURSE PRACTITIONER

## 2023-03-06 RX ORDER — LANOLIN ALCOHOL/MO/W.PET/CERES
1 CREAM (GRAM) TOPICAL
Qty: 90 TABLET | Refills: 0 | Status: SHIPPED | OUTPATIENT
Start: 2023-03-06

## 2023-03-06 RX ORDER — HYDROXYZINE HYDROCHLORIDE 25 MG/1
25 TABLET, FILM COATED ORAL NIGHTLY PRN
Qty: 30 TABLET | Refills: 1 | Status: SHIPPED | OUTPATIENT
Start: 2023-03-06

## 2023-03-06 RX ORDER — ESCITALOPRAM OXALATE 5 MG/1
5 TABLET ORAL DAILY
Qty: 30 TABLET | Refills: 0 | Status: SHIPPED | OUTPATIENT
Start: 2023-03-06

## 2023-03-06 RX ORDER — MONTELUKAST SODIUM 10 MG/1
10 TABLET ORAL NIGHTLY
Qty: 90 TABLET | Refills: 0 | Status: SHIPPED | OUTPATIENT
Start: 2023-03-06

## 2023-03-06 RX ORDER — FOLIC ACID 1 MG/1
1000 TABLET ORAL DAILY
Qty: 90 TABLET | Refills: 0 | Status: SHIPPED | OUTPATIENT
Start: 2023-03-06
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
## 2023-03-06 NOTE — PROGRESS NOTES
Venipuncture Blood Specimen Collection  Venipuncture performed in (R) arm  by Ruby Upton LPN with good hemostasis. Patient tolerated the procedure well without complications.   03/06/23   Ruby Upton LPN

## 2023-03-07 ENCOUNTER — TELEPHONE (OUTPATIENT)
Dept: FAMILY MEDICINE CLINIC | Age: 28
End: 2023-03-07
Payer: MEDICAID

## 2023-03-07 LAB
25(OH)D3 SERPL-MCNC: 27.1 NG/ML (ref 30–100)
FERRITIN SERPL-MCNC: 82.7 NG/ML (ref 13–150)
IRON 24H UR-MRATE: 155 MCG/DL (ref 37–145)
IRON SATN MFR SERPL: 41 % (ref 20–50)
TIBC SERPL-MCNC: 381 MCG/DL (ref 298–536)
TRANSFERRIN SERPL-MCNC: 256 MG/DL (ref 200–360)

## 2023-03-07 NOTE — ADDENDUM NOTE
Addended by: HARRY TOLEDO on: 3/7/2023 08:30 AM     Modules accepted: Orders     Detail Level: Zone Add High Risk Medication Management Associated Diagnosis?: Yes Length Of Therapy: 1.5 years

## 2023-03-07 NOTE — TELEPHONE ENCOUNTER
I notified patient of lab results.  Patient's vitamin D is still low, was 28 and now is 27.  Patient states she has been taking her vitamin D twice weekly as instructed.  Patient states she has a history of vitamin D deficiency and states she used to take 5000 units capsule of vitamin D daily.  Sent in 5000 units capsule of vitamin D at this time to patient's pharmacy on file for her to start taking daily.  Portia is setting up a nurse/MA visit for patient to come in for lab draw for vitamin D recheck in 6 weeks.

## 2023-03-20 ENCOUNTER — TELEPHONE (OUTPATIENT)
Dept: FAMILY MEDICINE CLINIC | Age: 28
End: 2023-03-20
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
## 2023-03-21 ENCOUNTER — TELEPHONE (OUTPATIENT)
Dept: FAMILY MEDICINE CLINIC | Age: 28
End: 2023-03-21
Payer: MEDICAID

## 2023-03-21 DIAGNOSIS — B37.31 VAGINAL YEAST INFECTION: Primary | ICD-10-CM

## 2023-03-21 NOTE — TELEPHONE ENCOUNTER
Caller: Deanna Bah    Relationship: Self    Best call back number: [phone]    Requested Prescriptions:   PATIENT IS REQUEST A YEAST INFECTION MEDICATION     Pharmacy where request should be sent: [address] [phone] [facility] [phone] [facility]     Last office visit with prescribing clinician: 3/6/2023   Last telemedicine visit with prescribing clinician: 4/18/2023   Next office visit with prescribing clinician: 6/5/2023     Additional details provided by patient:   Does the patient have less than a 3 day supply:  [] Yes  [] No    Would you like a call back once the refill request has been completed: [] Yes [] No    If the office needs to give you a call back, can they leave a voicemail: [] Yes [] No    Oliver Collado Rep   03/21/23 12:59 EDT

## 2023-03-23 RX ORDER — FLUCONAZOLE 200 MG/1
TABLET ORAL
Qty: 3 TABLET | Refills: 0 | Status: SHIPPED | OUTPATIENT
Start: 2023-03-23

## 2023-03-23 NOTE — TELEPHONE ENCOUNTER
Patient currently on antibiotic for strep infection.  Requesting Diflucan for vaginal yeast infection due to antibiotic use.  Sent Diflucan to patient's pharmacy on file.

## 2023-04-11 DIAGNOSIS — F41.0 GENERALIZED ANXIETY DISORDER WITH PANIC ATTACKS: ICD-10-CM

## 2023-04-11 DIAGNOSIS — F41.8 DEPRESSION WITH ANXIETY: ICD-10-CM

## 2023-04-11 DIAGNOSIS — F90.2 ADHD (ATTENTION DEFICIT HYPERACTIVITY DISORDER), COMBINED TYPE: ICD-10-CM

## 2023-04-11 DIAGNOSIS — F41.1 GENERALIZED ANXIETY DISORDER WITH PANIC ATTACKS: ICD-10-CM

## 2023-04-11 DIAGNOSIS — F41.0 PANIC ATTACKS: ICD-10-CM

## 2023-04-11 RX ORDER — ALPRAZOLAM 0.5 MG/1
0.5 TABLET ORAL EVERY 12 HOURS SCHEDULED
Qty: 60 TABLET | Refills: 0 | OUTPATIENT
Start: 2023-04-11

## 2023-04-11 RX ORDER — ESCITALOPRAM OXALATE 5 MG/1
TABLET ORAL
Qty: 30 TABLET | Refills: 0 | Status: SHIPPED | OUTPATIENT
Start: 2023-04-11

## 2023-04-11 RX ORDER — LISDEXAMFETAMINE DIMESYLATE 50 MG
50 CAPSULE ORAL EVERY MORNING
Qty: 30 CAPSULE | Refills: 0 | Status: SHIPPED | OUTPATIENT
Start: 2023-04-11

## 2023-04-11 RX ORDER — LISDEXAMFETAMINE DIMESYLATE 50 MG
50 CAPSULE ORAL EVERY MORNING
Qty: 30 CAPSULE | Refills: 0 | OUTPATIENT
Start: 2023-04-11

## 2023-04-11 RX ORDER — ALPRAZOLAM 0.5 MG/1
0.5 TABLET ORAL EVERY 12 HOURS SCHEDULED
Qty: 60 TABLET | Refills: 0 | Status: SHIPPED | OUTPATIENT
Start: 2023-04-11
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
## 2023-04-11 NOTE — TELEPHONE ENCOUNTER
Hub is instructed to read the documentation below to patient    These request have already been routed to the provider in a different encounter. This duplicate encounter will be disregarded.

## 2023-04-11 NOTE — TELEPHONE ENCOUNTER
Rx Refill Note  Requested Prescriptions     Pending Prescriptions Disp Refills   • escitalopram (LEXAPRO) 5 MG tablet [Pharmacy Med Name: Escitalopram Oxalate 5 MG Oral Tablet] 30 tablet 0     Sig: Take 1 tablet by mouth once daily   • Vyvanse 50 MG capsule 30 capsule 0     Sig: Take 1 capsule by mouth Every Morning   • ALPRAZolam (XANAX) 0.5 MG tablet 60 tablet 0     Sig: Take 1 tablet by mouth Every 12 (Twelve) Hours.      Last office visit with prescribing clinician: 3/6/2023   Last telemedicine visit with prescribing clinician: 4/12/2023   Next office visit with prescribing clinician: 6/5/2023                         Would you like a call back once the refill request has been completed: [] Yes [] No    If the office needs to give you a call back, can they leave a voicemail: [] Yes [] No    Oliver Aguiar Rep  04/11/23, 10:15 EDT

## 2023-05-03 ENCOUNTER — OFFICE VISIT (OUTPATIENT)
Dept: FAMILY MEDICINE CLINIC | Age: 28
End: 2023-05-03
Payer: MEDICAID

## 2023-05-03 VITALS
WEIGHT: 182.2 LBS | HEART RATE: 84 BPM | DIASTOLIC BLOOD PRESSURE: 82 MMHG | HEIGHT: 69 IN | RESPIRATION RATE: 18 BRPM | SYSTOLIC BLOOD PRESSURE: 106 MMHG | OXYGEN SATURATION: 98 % | TEMPERATURE: 97.7 F | BODY MASS INDEX: 26.98 KG/M2

## 2023-05-03 DIAGNOSIS — G43.001 MIGRAINE WITHOUT AURA AND WITH STATUS MIGRAINOSUS, NOT INTRACTABLE: ICD-10-CM

## 2023-05-03 DIAGNOSIS — J02.9 SORE THROAT: ICD-10-CM

## 2023-05-03 DIAGNOSIS — J02.0 ACUTE STREPTOCOCCAL PHARYNGITIS: Primary | ICD-10-CM

## 2023-05-03 LAB
EXPIRATION DATE: ABNORMAL
INTERNAL CONTROL: ABNORMAL
Lab: ABNORMAL
S PYO AG THROAT QL: POSITIVE

## 2023-05-03 PROCEDURE — 99213 OFFICE O/P EST LOW 20 MIN: CPT | Performed by: FAMILY MEDICINE

## 2023-05-03 PROCEDURE — 87880 STREP A ASSAY W/OPTIC: CPT | Performed by: FAMILY MEDICINE

## 2023-05-03 PROCEDURE — 1160F RVW MEDS BY RX/DR IN RCRD: CPT | Performed by: FAMILY MEDICINE

## 2023-05-03 PROCEDURE — 1159F MED LIST DOCD IN RCRD: CPT | Performed by: FAMILY MEDICINE

## 2023-05-03 RX ORDER — AZITHROMYCIN 250 MG/1
TABLET, FILM COATED ORAL
Qty: 6 TABLET | Refills: 0 | Status: SHIPPED | OUTPATIENT
Start: 2023-05-03

## 2023-05-03 RX ORDER — KETOROLAC TROMETHAMINE 30 MG/ML
30 INJECTION, SOLUTION INTRAMUSCULAR; INTRAVENOUS ONCE
Status: COMPLETED | OUTPATIENT
Start: 2023-05-03 | End: 2023-05-03

## 2023-05-03 RX ORDER — PROMETHAZINE HYDROCHLORIDE 25 MG/ML
25 INJECTION, SOLUTION INTRAMUSCULAR; INTRAVENOUS ONCE
Status: COMPLETED | OUTPATIENT
Start: 2023-05-03 | End: 2023-05-03

## 2023-05-03 RX ADMIN — PROMETHAZINE HYDROCHLORIDE 25 MG: 25 INJECTION, SOLUTION INTRAMUSCULAR; INTRAVENOUS at 14:10

## 2023-05-03 RX ADMIN — KETOROLAC TROMETHAMINE 30 MG: 30 INJECTION, SOLUTION INTRAMUSCULAR; INTRAVENOUS at 14:10

## 2023-05-03 NOTE — PROGRESS NOTES
"Chief Complaint  Bilateral ear pain (X 1 week; Does have complaints of occasional post nasal drip. ), Sore Throat (X 1 week ), and Medication Issues (Patient just wanted to let office know that she has not had her medications for approximately the past 2 weeks as to where she was living with someone who took her medications, and will not give them back to her. She is going to the police station after today's visit.  )    History of Present Illness   Ears:    Pain: bilateral.  Drainage: no  Nose:  Epistaxis: no. Nasal drainage: no. Nasal congestion: no. Post nasal drip: yes.   Sneezing: no.  Sinusitis:  Facial pain: no.  Headache: Migraine without aura began yesterday.  Tylenol is not helping.  States phenergan and Toradol provide relief, in the past.  Phenergan does not result in drowsiness and Deanna has a  today.  Throat:   Soreness: yes.  Dysphagia.  Lungs:  Cough: no.  Pleuritic pain: no.  Smoker: no.  General:  Myalgias: no. Malaise: no.   Fever: no.  Chills: no.   Gastroenterology:  Diarrhea: no. Nausea: yes, associated with the headache. Vomiting: no      Objective     Vital Signs:   /82 (BP Location: Left arm, Patient Position: Sitting, Cuff Size: Adult)   Pulse 84   Temp 97.7 °F (36.5 °C) (Infrared)   Resp 18   Ht 175.3 cm (69\")   Wt 82.6 kg (182 lb 3.2 oz)   SpO2 98%   BMI 26.91 kg/m²   Current Outpatient Medications on File Prior to Visit   Medication Sig Dispense Refill   • albuterol sulfate  (90 Base) MCG/ACT inhaler Inhale 2 puffs Every 4 (Four) Hours As Needed for Wheezing. 18 g 2   • ALPRAZolam (XANAX) 0.5 MG tablet Take 1 tablet by mouth Every 12 (Twelve) Hours. 60 tablet 0   • cyclobenzaprine (FLEXERIL) 10 MG tablet Take 1 tablet by mouth 3 (Three) Times a Day.     • escitalopram (LEXAPRO) 5 MG tablet Take 1 tablet by mouth once daily 30 tablet 0   • ferrous sulfate 325 (65 FE) MG EC tablet Take 1 tablet by mouth Daily With Breakfast. 90 tablet 0   • folic acid (FOLVITE) " 1 MG tablet Take 1 tablet by mouth Daily. 90 tablet 0   • hydrOXYzine (ATARAX) 25 MG tablet Take 1 tablet by mouth At Night As Needed for Anxiety. 30 tablet 1   • lamoTRIgine (LaMICtal) 25 MG tablet Take 1 tablet by mouth Every 12 (Twelve) Hours.     • meloxicam (Mobic) 15 MG tablet Take 1 tablet by mouth Daily. Prn pain 10 tablet 0   • montelukast (SINGULAIR) 10 MG tablet Take 1 tablet by mouth Every Night. 90 tablet 0   • vitamin D3 (Vitamin D) 125 MCG (5000 UT) capsule capsule Take 1 capsule by mouth Daily. 30 capsule 1   • Vyvanse 50 MG capsule Take 1 capsule by mouth Every Morning 30 capsule 0   • [DISCONTINUED] fluconazole (Diflucan) 200 MG tablet Take 1 tablet every other day. 3 tablet 0     No current facility-administered medications on file prior to visit.        Past Medical History:   Diagnosis Date   • Anxiety    • Asthma    • Bipolar 1 disorder    • Hypertension    • Lordoscoliosis       Past Surgical History:   Procedure Laterality Date   • KIDNEY STONE SURGERY     • MOUTH SURGERY        Family History   Problem Relation Age of Onset   • Hypertension Mother    • Diabetes Mother    • Liver cancer Father    • Diabetes Sister    • Liver cancer Maternal Grandmother    • Aneurysm Maternal Grandfather    • Breast cancer Paternal Grandmother       Social History     Socioeconomic History   • Marital status: Single   Tobacco Use   • Smoking status: Never   • Smokeless tobacco: Never   Vaping Use   • Vaping Use: Every day   • Substances: Flavoring   • Devices: Disposable   Substance and Sexual Activity   • Alcohol use: Never   • Sexual activity: Yes     Partners: Male         Office Visit on 03/06/2023   Component Date Value Ref Range Status   • 25 Hydroxy, Vitamin D 03/06/2023 27.1 (L)  30.0 - 100.0 ng/ml Final   • Iron 03/06/2023 155 (H)  37 - 145 mcg/dL Final   • Iron Saturation 03/06/2023 41  20 - 50 % Final   • Transferrin 03/06/2023 256  200 - 360 mg/dL Final   • TIBC 03/06/2023 381  298 - 536 mcg/dL  Final   • Ferritin 03/06/2023 82.70  13.00 - 150.00 ng/mL Final   Appointment on 02/14/2023   Component Date Value Ref Range Status   • Methamphetaine Screen, Urine 02/14/2023 Negative  Negative Final   • POC Amphetamines 02/14/2023 Negative  Negative Final   • Barbiturates Screen 02/14/2023 Negative  Negative Final   • Benzodiazepine Screen 02/14/2023 Positive (A)  Negative Final   • Cocaine Screen 02/14/2023 Negative  Negative Final   • Methadone Screen 02/14/2023 Negative  Negative Final   • Opiate Screen 02/14/2023 Negative  Negative Final   • Oxycodone, Screen 02/14/2023 Negative  Negative Final   • Phencyclidine (PCP) Screen 02/14/2023 Negative  Negative Final   • Propoxyphene Screen 02/14/2023 Negative  Negative Final   • THC, Screen 02/14/2023 Negative  Negative Final   • Tricyclic Antidepressants Screen 02/14/2023 Negative  Negative Final   Clinical Support on 02/08/2023   Component Date Value Ref Range Status   • Vitamin B-12 02/08/2023 252  211 - 946 pg/mL Final   • 25 Hydroxy, Vitamin D 02/08/2023 28.5 (L)  30.0 - 100.0 ng/ml Final   • Glucose 02/08/2023 87  65 - 99 mg/dL Final   • BUN 02/08/2023 10  6 - 20 mg/dL Final   • Creatinine 02/08/2023 0.70  0.57 - 1.00 mg/dL Final   • Sodium 02/08/2023 138  136 - 145 mmol/L Final   • Potassium 02/08/2023 4.2  3.5 - 5.2 mmol/L Final   • Chloride 02/08/2023 101  98 - 107 mmol/L Final   • CO2 02/08/2023 26.4  22.0 - 29.0 mmol/L Final   • Calcium 02/08/2023 9.9  8.6 - 10.5 mg/dL Final   • Total Protein 02/08/2023 7.4  6.0 - 8.5 g/dL Final   • Albumin 02/08/2023 4.5  3.5 - 5.2 g/dL Final   • ALT (SGPT) 02/08/2023 16  1 - 33 U/L Final   • AST (SGOT) 02/08/2023 15  1 - 32 U/L Final   • Alkaline Phosphatase 02/08/2023 74  39 - 117 U/L Final   • Total Bilirubin 02/08/2023 0.4  0.0 - 1.2 mg/dL Final   • Globulin 02/08/2023 2.9  gm/dL Final   • A/G Ratio 02/08/2023 1.6  g/dL Final   • BUN/Creatinine Ratio 02/08/2023 14.3  7.0 - 25.0 Final   • Anion Gap 02/08/2023 10.6   5.0 - 15.0 mmol/L Final   • eGFR 02/08/2023 122.5  >60.0 mL/min/1.73 Final   • TSH 02/08/2023 0.979  0.270 - 4.200 uIU/mL Final   • Hepatitis C Ab 02/08/2023 Non-Reactive  Non-Reactive Final   • WBC 02/08/2023 7.08  3.40 - 10.80 10*3/mm3 Final   • RBC 02/08/2023 4.70  3.77 - 5.28 10*6/mm3 Final   • Hemoglobin 02/08/2023 14.5  12.0 - 15.9 g/dL Final   • Hematocrit 02/08/2023 43.4  34.0 - 46.6 % Final   • MCV 02/08/2023 92.3  79.0 - 97.0 fL Final   • MCH 02/08/2023 30.9  26.6 - 33.0 pg Final   • MCHC 02/08/2023 33.4  31.5 - 35.7 g/dL Final   • RDW 02/08/2023 11.8 (L)  12.3 - 15.4 % Final   • RDW-SD 02/08/2023 40.3  37.0 - 54.0 fl Final   • MPV 02/08/2023 10.3  6.0 - 12.0 fL Final   • Platelets 02/08/2023 315  140 - 450 10*3/mm3 Final   • Neutrophil % 02/08/2023 63.8  42.7 - 76.0 % Final   • Lymphocyte % 02/08/2023 24.7  19.6 - 45.3 % Final   • Monocyte % 02/08/2023 9.0  5.0 - 12.0 % Final   • Eosinophil % 02/08/2023 2.0  0.3 - 6.2 % Final   • Basophil % 02/08/2023 0.4  0.0 - 1.5 % Final   • Immature Grans % 02/08/2023 0.1  0.0 - 0.5 % Final   • Neutrophils, Absolute 02/08/2023 4.51  1.70 - 7.00 10*3/mm3 Final   • Lymphocytes, Absolute 02/08/2023 1.75  0.70 - 3.10 10*3/mm3 Final   • Monocytes, Absolute 02/08/2023 0.64  0.10 - 0.90 10*3/mm3 Final   • Eosinophils, Absolute 02/08/2023 0.14  0.00 - 0.40 10*3/mm3 Final   • Basophils, Absolute 02/08/2023 0.03  0.00 - 0.20 10*3/mm3 Final   • Immature Grans, Absolute 02/08/2023 0.01  0.00 - 0.05 10*3/mm3 Final   • nRBC 02/08/2023 0.0  0.0 - 0.2 /100 WBC Final   Office Visit on 02/06/2023   Component Date Value Ref Range Status   • Color 02/06/2023 Yellow  Yellow, Straw, Dark Yellow, Ann Final   • Clarity, UA 02/06/2023 Slightly Cloudy (A)  Clear Final   • Glucose, UA 02/06/2023 Negative  Negative mg/dL Final   • Bilirubin 02/06/2023 Negative  Negative Final   • Ketones, UA 02/06/2023 Negative  Negative Final   • Specific Gravity  02/06/2023 1.015  1.005 - 1.030 Final   •  Blood, UA 02/06/2023 Negative  Negative Final   • pH, Urine 02/06/2023 7.0  5.0 - 8.0 Final   • Protein, POC 02/06/2023 Negative  Negative mg/dL Final   • Urobilinogen, UA 02/06/2023 0.2 E.U./dL  Normal, 0.2 E.U./dL Final   • Nitrite, UA 02/06/2023 Negative  Negative Final   • Leukocytes 02/06/2023 Negative  Negative Final   • Urine Culture 02/06/2023 No growth   Final   • Chlamydia DNA by PCR 02/06/2023 Not Detected  Not Detected, Invalid Final   • Neisseria gonorrhoeae by PCR 02/06/2023 Not Detected  Not Detected Final   • Trichomonas vaginosis 02/06/2023 Negative  Negative Final   • HCG, Urine, QL 02/06/2023 Negative  Negative Final   • Lot Number 02/06/2023 [id number]   Final   • Internal Positive Control 02/06/2023 Passed  Positive, Passed Final   • Internal Negative Control 02/06/2023 Passed  Negative, Passed Final   • Expiration Date 02/06/2023 9/30/2023   Final         Physical Exam   General:  No acute distress,  good energy level, interactive, cooperative with exam.  Eyes:  Normal.  Ears:  Canals: normal  TM: intact, no erythema, air fluid level, bulging, dilated vessels  Nose:  Breathing comfortably through the nose. Scant clear mucous. Normal pink mucosa, 2+ edema bilaterally.  Sinuses:  Frontal: non-tender  Maxillary: non-tender  Buccal Cavity:  Moist membranes  No oral lesions  Throat:  Mucous draining down the posterior oropharyngeal wall. No erythema. No exudate.  No laryngitis  Lungs:  Clear to auscultation bilaterally, excellent air movement throughout  Voice strong and clear  Cor:  Heart regular rate and rhythm without murmur   Abdomen:  Bowel sounds are normal pitch and frequency x 4 quadrants  No tenderness. No palpable mass.  Lymph nodes:  No enlarged anterior or posterior cervical lymph nodes  Neck:  Supple.  No palpable mass  Integument:  Warm, dry, pink without exanthema     Result Review  Data Reviewed:{ Labs  Result Review  Imaging  Med Tab  Media :23}                     Assessment  and Plan {CC Problem List  Visit Diagnosis  ROS  Review (Popup)  Health Maintenance  Quality  BestPractice  Medications  SmartSets  SnapShot Encounters  Media :23}   Diagnoses and all orders for this visit:    1. Acute streptococcal pharyngitis (Primary)  Comments:  MDM: Start antibiotic  Orders:  -     azithromycin (Zithromax) 250 MG tablet; Take 2 tabs now, then 1 tab daily for the next 4 days  Dispense: 6 tablet; Refill: 0    2. Migraine without aura and with status migrainosus, not intractable  Comments:  MDM: Rest.  Keep well hydrated          Follow Up {Instructions Charge Capture  Follow-up Communications :23}     Patient was given instructions and counseling regarding her condition or for health maintenance advice. Please see specific information pulled into the AVS (placed there by myself) if appropriate.    Return if symptoms worsen or fail to improve.        Faby Davenport MD

## 2023-05-07 DIAGNOSIS — F41.0 GENERALIZED ANXIETY DISORDER WITH PANIC ATTACKS: ICD-10-CM

## 2023-05-07 DIAGNOSIS — F41.1 GENERALIZED ANXIETY DISORDER WITH PANIC ATTACKS: ICD-10-CM

## 2023-05-08 RX ORDER — HYDROXYZINE HYDROCHLORIDE 25 MG/1
25 TABLET, FILM COATED ORAL NIGHTLY PRN
Qty: 30 TABLET | Refills: 0 | Status: SHIPPED | OUTPATIENT
Start: 2023-05-08

## 2023-05-08 NOTE — TELEPHONE ENCOUNTER
Rx Refill Note  Requested Prescriptions     Pending Prescriptions Disp Refills   • hydrOXYzine (ATARAX) 25 MG tablet [Pharmacy Med Name: hydrOXYzine HCl 25 MG Oral Tablet] 30 tablet 0     Sig: TAKE 1 TABLET BY MOUTH AT NIGHT AS NEEDED FOR ANXIETY      Last office visit with prescribing clinician: 3/6/2023   Last telemedicine visit with prescribing clinician: 6/5/2023   Next office visit with prescribing clinician: 6/5/2023                         Would you like a call back once the refill request has been completed: [] Yes [] No    If the office needs to give you a call back, can they leave a voicemail: [] Yes [] No    Ruby Upton LPN  05/08/23, 08:22 EDT

## 2023-05-11 ENCOUNTER — TELEPHONE (OUTPATIENT)
Dept: FAMILY MEDICINE CLINIC | Age: 28
End: 2023-05-11
Payer: MEDICAID

## 2023-05-11 NOTE — TELEPHONE ENCOUNTER
Pt called stating she was seen @ [facility] ER on 5/10/23. She states she's being treated , for a UTI. [facility] has been called for records. The pt states she has excruciating low back pain.

## 2023-05-15 DIAGNOSIS — F41.0 PANIC ATTACKS: ICD-10-CM

## 2023-05-16 RX ORDER — ALPRAZOLAM 0.5 MG/1
TABLET ORAL
Qty: 60 TABLET | Refills: 0 | Status: SHIPPED | OUTPATIENT
Start: 2023-05-16

## 2023-05-16 NOTE — TELEPHONE ENCOUNTER
INSPECT DONE ON 05-    Rx Refill Note  Requested Prescriptions     Pending Prescriptions Disp Refills   • ALPRAZolam (XANAX) 0.5 MG tablet [Pharmacy Med Name: ALPRAZolam 0.5 MG Oral Tablet] 60 tablet 0     Sig: TAKE 1 TABLET BY MOUTH EVERY 12 HOURS      Last office visit with prescribing clinician: 3/6/2023   Last telemedicine visit with prescribing clinician: 5/11/2023   Next office visit with prescribing clinician: 6/5/2023                         Would you like a call back once the refill request has been completed: [] Yes [] No    If the office needs to give you a call back, can they leave a voicemail: [] Yes [] No    Tisha Riley CMA  05/16/23, 09:26 EDT

## 2023-05-19 DIAGNOSIS — F90.2 ADHD (ATTENTION DEFICIT HYPERACTIVITY DISORDER), COMBINED TYPE: ICD-10-CM

## 2023-05-19 RX ORDER — LISDEXAMFETAMINE DIMESYLATE 50 MG
50 CAPSULE ORAL EVERY MORNING
Qty: 30 CAPSULE | Refills: 0 | Status: SHIPPED | OUTPATIENT
Start: 2023-05-19

## 2023-05-19 NOTE — TELEPHONE ENCOUNTER
Rx Refill Note    PROTOCOLS NOT MET     Requested Prescriptions     Pending Prescriptions Disp Refills   • Vyvanse 50 MG capsule 30 capsule 0     Sig: Take 1 capsule by mouth Every Morning      Last office visit with prescribing clinician: 3/6/2023      Next office visit with prescribing clinician: 6/5/2023       CONTROLLED SUBSTANCE AGREEMENT - SCAN - CONTROLLED SUBSTANCE AGREEMENT/[facility] EUGENIE/2-14-23 (02/14/2023)    POC Urine Drug Screen, Triage (02/14/2023 15:00)    INSPECT - SCAN - INSPECT/ [facility] EUGENIE/ 05.19.2023 (05/19/2023)           Ruby Upton LPN  05/19/23, 12:07 EDT

## 2023-05-19 NOTE — TELEPHONE ENCOUNTER
Caller: Deanna Bah    Relationship: Self    Best call back number: [phone]    Requested Prescriptions:   Requested Prescriptions     Pending Prescriptions Disp Refills   • Vyvanse 50 MG capsule 30 capsule 0     Sig: Take 1 capsule by mouth Every Morning        Pharmacy where request should be sent: [address] [phone] [facility] [phone] FX     Last office visit with prescribing clinician: 3/6/2023   Last telemedicine visit with prescribing clinician: 5/15/2023   Next office visit with prescribing clinician: 6/5/2023     Additional details provided by patient:    Does the patient have less than a 3 day supply:  [x] Yes  [] No    Would you like a call back once the refill request has been completed: [x] Yes [] No    If the office needs to give you a call back, can they leave a voicemail: [x] Yes [] No    Oliver Velasquez Rep   05/19/23 11:42 EDT

## 2023-05-25 ENCOUNTER — TELEPHONE (OUTPATIENT)
Dept: FAMILY MEDICINE CLINIC | Age: 28
End: 2023-05-25

## 2023-05-25 NOTE — TELEPHONE ENCOUNTER
Hub staff attempted to follow warm transfer process and was unsuccessful     Caller: Deanna Bah I    Relationship to patient: Self    Best call back number: [phone]    Patient is needing:     IS EXPERIENCING VAGINAL PRESSURE WHILE URINATING AND LOWER BACK PAIN.     PLEASE ADVISE.     WOULD LIKE A CALL BACK TO DISCUSS

## 2023-06-03 DIAGNOSIS — D50.8 IRON DEFICIENCY ANEMIA SECONDARY TO INADEQUATE DIETARY IRON INTAKE: ICD-10-CM

## 2023-06-03 DIAGNOSIS — Z76.0 MEDICATION REFILL: ICD-10-CM

## 2023-06-05 ENCOUNTER — APPOINTMENT (OUTPATIENT)
Dept: CT IMAGING | Facility: HOSPITAL | Age: 28
End: 2023-06-05
Payer: MEDICAID

## 2023-06-05 ENCOUNTER — HOSPITAL ENCOUNTER (EMERGENCY)
Facility: HOSPITAL | Age: 28
Discharge: HOME | End: 2023-06-05
Attending: EMERGENCY MEDICINE | Admitting: EMERGENCY MEDICINE
Payer: MEDICAID

## 2023-06-05 VITALS
HEART RATE: 56 BPM | SYSTOLIC BLOOD PRESSURE: 120 MMHG | OXYGEN SATURATION: 99 % | HEIGHT: 68 IN | WEIGHT: 182.1 LBS | TEMPERATURE: 98.2 F | DIASTOLIC BLOOD PRESSURE: 71 MMHG | RESPIRATION RATE: 16 BRPM | BODY MASS INDEX: 27.6 KG/M2

## 2023-06-05 DIAGNOSIS — R10.9 RIGHT FLANK PAIN: ICD-10-CM

## 2023-06-05 DIAGNOSIS — R10.9 ABDOMINAL PAIN, UNSPECIFIED ABDOMINAL LOCATION: Primary | ICD-10-CM

## 2023-06-05 DIAGNOSIS — E27.8 ADRENAL NODULE: ICD-10-CM

## 2023-06-05 LAB
ALBUMIN SERPL-MCNC: 4.5 G/DL (ref 3.5–5.2)
ALBUMIN/GLOB SERPL: 1.4 G/DL
ALP SERPL-CCNC: 68 U/L (ref 39–117)
ALT SERPL W P-5'-P-CCNC: 8 U/L (ref 1–33)
ANION GAP SERPL CALCULATED.3IONS-SCNC: 9 MMOL/L (ref 5–15)
AST SERPL-CCNC: 21 U/L (ref 1–32)
BACTERIA UR QL AUTO: ABNORMAL /HPF
BASOPHILS # BLD AUTO: 0 10*3/MM3 (ref 0–0.2)
BASOPHILS NFR BLD AUTO: 0.5 % (ref 0–1.5)
BILIRUB SERPL-MCNC: 0.3 MG/DL (ref 0–1.2)
BILIRUB UR QL STRIP: NEGATIVE
BUN SERPL-MCNC: 12 MG/DL (ref 6–20)
BUN/CREAT SERPL: 19.7 (ref 7–25)
CALCIUM SPEC-SCNC: 10 MG/DL (ref 8.6–10.5)
CHLORIDE SERPL-SCNC: 105 MMOL/L (ref 98–107)
CLARITY UR: ABNORMAL
CO2 SERPL-SCNC: 26 MMOL/L (ref 22–29)
COLOR UR: YELLOW
CREAT SERPL-MCNC: 0.61 MG/DL (ref 0.57–1)
DEPRECATED RDW RBC AUTO: 42.9 FL (ref 37–54)
EGFRCR SERPLBLD CKD-EPI 2021: 126.6 ML/MIN/1.73
EOSINOPHIL # BLD AUTO: 0.1 10*3/MM3 (ref 0–0.4)
EOSINOPHIL NFR BLD AUTO: 2.1 % (ref 0.3–6.2)
ERYTHROCYTE [DISTWIDTH] IN BLOOD BY AUTOMATED COUNT: 13.4 % (ref 12.3–15.4)
GLOBULIN UR ELPH-MCNC: 3.2 GM/DL
GLUCOSE SERPL-MCNC: 107 MG/DL (ref 65–99)
GLUCOSE UR STRIP-MCNC: NEGATIVE MG/DL
HCG SERPL QL: NEGATIVE
HCT VFR BLD AUTO: 38.7 % (ref 34–46.6)
HGB BLD-MCNC: 12.6 G/DL (ref 12–15.9)
HGB UR QL STRIP.AUTO: NEGATIVE
HYALINE CASTS UR QL AUTO: ABNORMAL /LPF
KETONES UR QL STRIP: NEGATIVE
LEUKOCYTE ESTERASE UR QL STRIP.AUTO: ABNORMAL
LIPASE SERPL-CCNC: 38 U/L (ref 13–60)
LYMPHOCYTES # BLD AUTO: 1.4 10*3/MM3 (ref 0.7–3.1)
LYMPHOCYTES NFR BLD AUTO: 23.9 % (ref 19.6–45.3)
MCH RBC QN AUTO: 29.6 PG (ref 26.6–33)
MCHC RBC AUTO-ENTMCNC: 32.6 G/DL (ref 31.5–35.7)
MCV RBC AUTO: 90.9 FL (ref 79–97)
MONOCYTES # BLD AUTO: 0.7 10*3/MM3 (ref 0.1–0.9)
MONOCYTES NFR BLD AUTO: 11.2 % (ref 5–12)
NEUTROPHILS NFR BLD AUTO: 3.7 10*3/MM3 (ref 1.7–7)
NEUTROPHILS NFR BLD AUTO: 62.3 % (ref 42.7–76)
NITRITE UR QL STRIP: NEGATIVE
NRBC BLD AUTO-RTO: 0.1 /100 WBC (ref 0–0.2)
PH UR STRIP.AUTO: <=5 [PH] (ref 5–8)
PLATELET # BLD AUTO: 273 10*3/MM3 (ref 140–450)
PMV BLD AUTO: 8 FL (ref 6–12)
POTASSIUM SERPL-SCNC: 4 MMOL/L (ref 3.5–5.2)
PROT SERPL-MCNC: 7.7 G/DL (ref 6–8.5)
PROT UR QL STRIP: NEGATIVE
RBC # BLD AUTO: 4.25 10*6/MM3 (ref 3.77–5.28)
RBC # UR STRIP: ABNORMAL /HPF
REF LAB TEST METHOD: ABNORMAL
SODIUM SERPL-SCNC: 140 MMOL/L (ref 136–145)
SP GR UR STRIP: 1.02 (ref 1–1.03)
SQUAMOUS #/AREA URNS HPF: ABNORMAL /HPF
UROBILINOGEN UR QL STRIP: ABNORMAL
WBC # UR STRIP: ABNORMAL /HPF
WBC NRBC COR # BLD: 5.9 10*3/MM3 (ref 3.4–10.8)

## 2023-06-05 PROCEDURE — 74177 CT ABD & PELVIS W/CONTRAST: CPT

## 2023-06-05 PROCEDURE — 25010000002 KETOROLAC TROMETHAMINE PER 15 MG: Performed by: EMERGENCY MEDICINE

## 2023-06-05 PROCEDURE — 81001 URINALYSIS AUTO W/SCOPE: CPT | Performed by: EMERGENCY MEDICINE

## 2023-06-05 PROCEDURE — 80053 COMPREHEN METABOLIC PANEL: CPT | Performed by: EMERGENCY MEDICINE

## 2023-06-05 PROCEDURE — 83690 ASSAY OF LIPASE: CPT | Performed by: EMERGENCY MEDICINE

## 2023-06-05 PROCEDURE — 96374 THER/PROPH/DIAG INJ IV PUSH: CPT

## 2023-06-05 PROCEDURE — 25010000002 MORPHINE PER 10 MG: Performed by: EMERGENCY MEDICINE

## 2023-06-05 PROCEDURE — 25510000001 IOPAMIDOL PER 1 ML: Performed by: EMERGENCY MEDICINE

## 2023-06-05 PROCEDURE — 25010000002 ONDANSETRON PER 1 MG: Performed by: EMERGENCY MEDICINE

## 2023-06-05 PROCEDURE — 85025 COMPLETE CBC W/AUTO DIFF WBC: CPT | Performed by: EMERGENCY MEDICINE

## 2023-06-05 PROCEDURE — 99283 EMERGENCY DEPT VISIT LOW MDM: CPT

## 2023-06-05 PROCEDURE — 84703 CHORIONIC GONADOTROPIN ASSAY: CPT | Performed by: EMERGENCY MEDICINE

## 2023-06-05 PROCEDURE — 96375 TX/PRO/DX INJ NEW DRUG ADDON: CPT

## 2023-06-05 RX ORDER — LANOLIN ALCOHOL/MO/W.PET/CERES
CREAM (GRAM) TOPICAL
Qty: 90 TABLET | Refills: 0 | Status: SHIPPED | OUTPATIENT
Start: 2023-06-05

## 2023-06-05 RX ORDER — ONDANSETRON 2 MG/ML
4 INJECTION INTRAMUSCULAR; INTRAVENOUS ONCE
Status: COMPLETED | OUTPATIENT
Start: 2023-06-05 | End: 2023-06-05

## 2023-06-05 RX ORDER — KETOROLAC TROMETHAMINE 15 MG/ML
15 INJECTION, SOLUTION INTRAMUSCULAR; INTRAVENOUS ONCE
Status: COMPLETED | OUTPATIENT
Start: 2023-06-05 | End: 2023-06-05

## 2023-06-05 RX ORDER — ONDANSETRON 4 MG/1
4 TABLET, ORALLY DISINTEGRATING ORAL EVERY 8 HOURS PRN
Qty: 12 TABLET | Refills: 0 | Status: SHIPPED | OUTPATIENT
Start: 2023-06-05

## 2023-06-05 RX ORDER — SODIUM CHLORIDE 0.9 % (FLUSH) 0.9 %
10 SYRINGE (ML) INJECTION AS NEEDED
Status: DISCONTINUED | OUTPATIENT
Start: 2023-06-05 | End: 2023-06-06 | Stop reason: HOSPADM

## 2023-06-05 RX ADMIN — ONDANSETRON 4 MG: 2 INJECTION INTRAMUSCULAR; INTRAVENOUS at 20:18

## 2023-06-05 RX ADMIN — MORPHINE SULFATE 4 MG: 4 INJECTION INTRAVENOUS at 21:34

## 2023-06-05 RX ADMIN — KETOROLAC TROMETHAMINE 15 MG: 15 INJECTION, SOLUTION INTRAMUSCULAR; INTRAVENOUS at 20:18

## 2023-06-05 RX ADMIN — IOPAMIDOL 100 ML: 755 INJECTION, SOLUTION INTRAVENOUS at 21:34

## 2023-06-05 RX ADMIN — SODIUM CHLORIDE, POTASSIUM CHLORIDE, SODIUM LACTATE AND CALCIUM CHLORIDE 1000 ML: 600; 310; 30; 20 INJECTION, SOLUTION INTRAVENOUS at 20:12

## 2023-06-05 NOTE — TELEPHONE ENCOUNTER
Rx Refill Note  Requested Prescriptions     Pending Prescriptions Disp Refills    ferrous sulfate 325 (65 FE) MG EC tablet [Pharmacy Med Name: Ferrous Sulfate 325 (65 Fe) MG Oral Tablet Delayed Release] 90 tablet 0     Sig: Take 1 tablet by mouth once daily with breakfast      Last office visit with prescribing clinician: 3/6/2023   Last telemedicine visit with prescribing clinician: Visit date not found   Next office visit with prescribing clinician: 6/5/2023       Ferritin (03/06/2023 14:32)  Iron Profile (03/06/2023 14:32)  CBC w AUTO Differential (02/08/2023 10:26)                      Would you like a call back once the refill request has been completed: [] Yes [] No    If the office needs to give you a call back, can they leave a voicemail: [] Yes [] No    Ruby Upton LPN  06/05/23, 10:37 EDT

## 2023-06-05 NOTE — ED NOTES
Pt arrived via [facility] c/o lower abdominal pain and back pain that started approx a month and increased earlier today. Pt reports she was seen at [facility] and diagnosed with kidney stones approx 3 weeks ago and reports passing a kidney stone at [facility] on Wednesday. Pt denies any other urinary symptoms. Pt reports n/v as well

## 2023-06-06 NOTE — ED PROVIDER NOTES
Subjective   History of Present Illness  27-year-old female with history of anxiety, asthma, hypertension, bipolar disorder presents for left upper quadrant and right flank pain.  States she had some mild urinary urgency but no dysuria.  Started vomiting today.  No history of abdominal surgeries.  Has required a stent before for ureterolithiasis.  Only pregnancy was vaginal delivery.  Was having some left lower back pain previously but it is since moved to right.  Had a CT scan that showed 2 small punctate stones in her kidney at outside ED.  She states that she passed one somewhat recently.  No fevers.  No chest pain or shortness of breath.  States she uses a marijuana pen daily.  Denies being a smoker or alcohol user.  Denies other drugs.  Review of Systems  Positive for left upper quadrant pain, right flank pain, urgency, nausea and vomiting.  Past Medical History:   Diagnosis Date    Anxiety     Asthma     Bipolar 1 disorder     Hypertension     Lordoscoliosis        Allergies   Allergen Reactions    Latex Rash       Past Surgical History:   Procedure Laterality Date    KIDNEY STONE SURGERY      MOUTH SURGERY         Family History   Problem Relation Age of Onset    Hypertension Mother     Diabetes Mother     Liver cancer Father     Diabetes Sister     Liver cancer Maternal Grandmother     Aneurysm Maternal Grandfather     Breast cancer Paternal Grandmother        Social History     Socioeconomic History    Marital status: Single   Tobacco Use    Smoking status: Never    Smokeless tobacco: Never   Vaping Use    Vaping Use: Every day    Substances: Flavoring    Devices: Disposable   Substance and Sexual Activity    Alcohol use: Never    Sexual activity: Yes     Partners: Male           Objective   Physical Exam  Constitutional:  No acute distress.  Head:  Atraumatic.  Normocephalic.   Eyes:  No scleral icterus. Normal conjunctivae  ENT:  Moist mucosa.  No nasal discharge present.  Cardiovascular:  Well perfused.   "Equal pulses.  Regular rhythm and normal rate.  Normal capillary refill.    Pulmonary/Chest:  No respiratory distress.  Airway patent.  No tachypnea.  No accessory muscle usage.  Clear to auscultation bilaterally.  Abdominal:  Nondistended. Nontender.   Back: Positive for right CVA tenderness.  Negative for left CVA tenderness to palpation.  Extremities:  No peripheral edema.  No Deformity  Skin:  Warm, dry  Neurological:  Alert, awake, and appropriate.  Normal speech.      Procedures           ED Course      /71   Pulse 56   Temp 98.2 °F (36.8 °C) (Oral)   Resp 16   Ht 172.7 cm (68\")   Wt 82.6 kg (182 lb 1.6 oz)   LMP 05/07/2023 (Approximate)   SpO2 99%   BMI 27.69 kg/m²   Labs Reviewed   COMPREHENSIVE METABOLIC PANEL - Abnormal; Notable for the following components:       Result Value    Glucose 107 (*)     All other components within normal limits    Narrative:     GFR Normal >60  Chronic Kidney Disease <60  Kidney Failure <15     URINALYSIS W/ CULTURE IF INDICATED - Abnormal; Notable for the following components:    Appearance, UA Hazy (*)     Leuk Esterase, UA Trace (*)     All other components within normal limits    Narrative:     In absence of clinical symptoms, the presence of pyuria, bacteria, and/or nitrites on the urinalysis result does not correlate with infection.   URINALYSIS, MICROSCOPIC ONLY - Abnormal; Notable for the following components:    RBC, UA 3-5 (*)     WBC, UA 3-5 (*)     Bacteria, UA Trace (*)     Squamous Epithelial Cells, UA 7-12 (*)     All other components within normal limits   LIPASE - Normal   HCG, SERUM, QUALITATIVE - Normal   CBC WITH AUTO DIFFERENTIAL - Normal   CBC AND DIFFERENTIAL    Narrative:     The following orders were created for panel order CBC & Differential.  Procedure                               Abnormality         Status                     ---------                               -----------         ------                     CBC Auto " Differential[[id number]]        Normal              Final result                 Please view results for these tests on the individual orders.     Medications   sodium chloride 0.9 % flush 10 mL (has no administration in time range)   ketorolac (TORADOL) injection 15 mg (15 mg Intravenous Given 6/5/23 2018)   lactated ringers bolus 1,000 mL (0 mL Intravenous Stopped 6/5/23 2137)   ondansetron (ZOFRAN) injection 4 mg (4 mg Intravenous Given 6/5/23 2018)   morphine injection 4 mg (4 mg Intravenous Given 6/5/23 2134)   iopamidol (ISOVUE-370) 76 % injection 100 mL (100 mL Intravenous Given 6/5/23 2134)     CT Abdomen Pelvis With Contrast    Result Date: 6/5/2023  No obstructive uropathy. 2 mm nonobstructing calculus right inferior pole kidney. Low-density nodule of the left adrenal gland. Not definitely characterized as adenoma. No finding to explain patient's right-sided pain on imaging. Electronically Signed: Iraida Webb  6/5/2023 9:46 PM EDT  Workstation ID: [id number]                                        Medical Decision Making  Amount and/or Complexity of Data Reviewed  Labs: ordered.  Radiology: ordered.    Risk  Prescription drug management.    My interpretation of CT abdomen pelvis with contrast negative for obstructive uropathy.  See above for radiology interpretation.    Patient nontoxic-appearing.  Abdomen without peritoneal signs on exam.  Reassuring vital signs.  Labs reassuring.  CT without acute findings.  Discussed adrenal lesion with patient.  Will DC with Zofran.  Return ER precautions discussed.    Final diagnoses:   Abdominal pain, unspecified abdominal location   Right flank pain   Adrenal nodule       ED Disposition  ED Disposition       ED Disposition   Discharge    Condition   Stable    Comment   --               Favian Dueñas, APRN  [address]  [place] [address]  [phone]    In 3 days           Medication List        New Prescriptions      ondansetron ODT 4 MG disintegrating  tablet  Commonly known as: ZOFRAN-ODT  Place 1 tablet on the tongue Every 8 (Eight) Hours As Needed for Nausea or Vomiting.               Where to Get Your Medications        These medications were sent to [id number] - [place], [place] - [address] - [phone]  - [phone]   [address] [place] [address]      Phone: [phone]   ondansetron ODT 4 MG disintegrating tablet            Сергей Garcia MD  06/05/23 5140

## 2023-07-27 DIAGNOSIS — E55.9 VITAMIN D DEFICIENCY: ICD-10-CM

## 2023-07-27 DIAGNOSIS — F41.0 PANIC ATTACKS: ICD-10-CM

## 2023-07-27 DIAGNOSIS — Z76.0 MEDICATION REFILL: ICD-10-CM

## 2023-07-27 RX ORDER — FOLIC ACID 1 MG/1
1000 TABLET ORAL DAILY
Qty: 90 TABLET | Refills: 0 | Status: SHIPPED | OUTPATIENT
Start: 2023-07-27

## 2023-07-27 RX ORDER — ALPRAZOLAM 0.5 MG/1
0.5 TABLET ORAL EVERY 12 HOURS
Qty: 60 TABLET | Refills: 0 | Status: SHIPPED | OUTPATIENT
Start: 2023-07-27

## 2023-07-27 NOTE — TELEPHONE ENCOUNTER
Caller: Deanna Bah    Relationship: Self    Best call back number:   Deanna Bah (Self) [phone] (Mobile)       Requested Prescriptions:   Requested Prescriptions     Pending Prescriptions Disp Refills    ALPRAZolam (XANAX) 0.5 MG tablet 60 tablet 0     Sig: Take 1 tablet by mouth Every 12 (Twelve) Hours.    vitamin D3 (Vitamin D) 125 MCG (5000 UT) capsule capsule 30 capsule 1     Sig: Take 1 capsule by mouth Daily.    folic acid (FOLVITE) 1 MG tablet 90 tablet 0     Sig: Take 1 tablet by mouth Daily.        Pharmacy where request should be sent: [facility] [address] [address] - [phone]  - [phone] FX     Last office visit with prescribing clinician: 3/6/2023   Last telemedicine visit with prescribing clinician: Visit date not found   Next office visit with prescribing clinician: Visit date not found     Additional details provided by patient:     Does the patient have less than a 3 day supply:  [x] Yes  [] No    Would you like a call back once the refill request has been completed: [] Yes [] No    If the office needs to give you a call back, can they leave a voicemail: [] Yes [] No    Oliver Pena   07/27/23 12:21 EDT

## 2023-07-31 DIAGNOSIS — F90.2 ADHD (ATTENTION DEFICIT HYPERACTIVITY DISORDER), COMBINED TYPE: ICD-10-CM

## 2023-08-01 ENCOUNTER — TELEPHONE (OUTPATIENT)
Dept: FAMILY MEDICINE CLINIC | Age: 28
End: 2023-08-01

## 2023-08-01 RX ORDER — LISDEXAMFETAMINE DIMESYLATE 50 MG
50 CAPSULE ORAL EVERY MORNING
Qty: 30 CAPSULE | Refills: 0 | Status: SHIPPED | OUTPATIENT
Start: 2023-08-01

## 2023-08-01 NOTE — TELEPHONE ENCOUNTER
Caller: Deanna Bah    Relationship: Self    Best call back number:  [phone]       PATIENT STATES HER XANAX WAS CALLED INTO [facility] IN [place].    SHE NORMALLY PAYS CASH FOR THIS MEDICATION AND THE PHARMACY TOLD HER THEY CANNOT FILL THIS FOR HER UNTIL THEY HEAR FROM OUR OFFICE.      PATIENT IS COMPLETELY OUT .    PLEASE GIVE HER A CALLBACK IN REGARDS TO THIS.

## 2023-08-02 NOTE — TELEPHONE ENCOUNTER
The pt was informed in the past, that she needs to get an appt with [facility] for this medication. The pt had stated that she had called [facility], but they hadn't called her back. The pt voiced understanding of the information given, but yet continues to request this medication.

## 2023-08-10 ENCOUNTER — TELEPHONE (OUTPATIENT)
Dept: FAMILY MEDICINE CLINIC | Age: 28
End: 2023-08-10
Payer: MEDICAID

## 2023-08-10 NOTE — TELEPHONE ENCOUNTER
Pt is requesting her Xanax be transferred to [facility] in [place]. The pt states the pharmacy in [place] will not give her the script that was sent over to [place]. The pt states she pays for her Xanax our of pocket.

## 2023-08-15 ENCOUNTER — TELEPHONE (OUTPATIENT)
Dept: FAMILY MEDICINE CLINIC | Age: 28
End: 2023-08-15

## 2023-08-15 NOTE — TELEPHONE ENCOUNTER
Caller: Deanna Bah    Relationship: Self    Best call back number: [phone]     Requested Prescriptions: BIRTH CONTROL MEDICATION  Requested Prescriptions      No prescriptions requested or ordered in this encounter        Pharmacy where request should be sent:    [address] - [phone]  - [phone] FX   Last office visit with prescribing clinician: 3/6/2023   Last telemedicine visit with prescribing clinician: Visit date not found   Next office visit with prescribing clinician: Visit date not found     Additional details provided by patient: PATIENT IS CALLING TO ASK IF [facility] WOULD PRESCRIBE BIRTH CONTROL MEDICATION FOR HER.    Does the patient have less than a 3 day supply:  [x] Yes  [] No    Would you like a call back once the refill request has been completed: [] Yes [] No    If the office needs to give you a call back, can they leave a voicemail: [] Yes [] No    Oliver Giron Rep   08/15/23 11:55 EDT     PLEASE ADVISE.

## 2023-08-24 ENCOUNTER — OFFICE VISIT (OUTPATIENT)
Dept: FAMILY MEDICINE CLINIC | Age: 28
End: 2023-08-24
Payer: MEDICAID

## 2023-08-24 ENCOUNTER — TELEPHONE (OUTPATIENT)
Dept: FAMILY MEDICINE CLINIC | Age: 28
End: 2023-08-24

## 2023-08-24 VITALS
BODY MASS INDEX: 28.34 KG/M2 | WEIGHT: 187 LBS | DIASTOLIC BLOOD PRESSURE: 82 MMHG | OXYGEN SATURATION: 99 % | TEMPERATURE: 98 F | RESPIRATION RATE: 16 BRPM | HEART RATE: 74 BPM | SYSTOLIC BLOOD PRESSURE: 116 MMHG | HEIGHT: 68 IN

## 2023-08-24 DIAGNOSIS — F41.0 PANIC ATTACKS: ICD-10-CM

## 2023-08-24 DIAGNOSIS — F41.9 ANXIETY: Primary | ICD-10-CM

## 2023-08-24 DIAGNOSIS — Z76.0 MEDICATION REFILL: ICD-10-CM

## 2023-08-24 DIAGNOSIS — R11.0 NAUSEA: ICD-10-CM

## 2023-08-24 DIAGNOSIS — Z79.899 MEDICATION MANAGEMENT: ICD-10-CM

## 2023-08-24 DIAGNOSIS — F31.9 BIPOLAR DEPRESSION: Primary | ICD-10-CM

## 2023-08-24 DIAGNOSIS — E55.9 VITAMIN D DEFICIENCY: ICD-10-CM

## 2023-08-24 DIAGNOSIS — Z79.899 CHRONIC PRESCRIPTION BENZODIAZEPINE USE: ICD-10-CM

## 2023-08-24 DIAGNOSIS — F31.9 BIPOLAR DEPRESSION: ICD-10-CM

## 2023-08-24 DIAGNOSIS — J45.20 MILD INTERMITTENT ASTHMA WITHOUT COMPLICATION: ICD-10-CM

## 2023-08-24 DIAGNOSIS — D50.8 IRON DEFICIENCY ANEMIA SECONDARY TO INADEQUATE DIETARY IRON INTAKE: ICD-10-CM

## 2023-08-24 DIAGNOSIS — F90.2 ADHD (ATTENTION DEFICIT HYPERACTIVITY DISORDER), COMBINED TYPE: ICD-10-CM

## 2023-08-24 LAB
POC AMPHETAMINES: NEGATIVE
POC BARBITURATES: NEGATIVE
POC BENZODIAZEPHINES: NEGATIVE
POC COCAINE: NEGATIVE
POC METHADONE: NEGATIVE
POC METHAMPHETAMINE SCREEN URINE: NEGATIVE
POC OPIATES: NEGATIVE
POC OXYCODONE: NEGATIVE
POC PHENCYCLIDINE: NEGATIVE
POC PROPOXYPHENE: NEGATIVE
POC THC: NEGATIVE
POC TRICYCLIC ANTIDEPRESSANTS: NEGATIVE

## 2023-08-24 PROCEDURE — 80305 DRUG TEST PRSMV DIR OPT OBS: CPT | Performed by: NURSE PRACTITIONER

## 2023-08-24 PROCEDURE — 1160F RVW MEDS BY RX/DR IN RCRD: CPT | Performed by: NURSE PRACTITIONER

## 2023-08-24 PROCEDURE — 99214 OFFICE O/P EST MOD 30 MIN: CPT | Performed by: NURSE PRACTITIONER

## 2023-08-24 PROCEDURE — 1159F MED LIST DOCD IN RCRD: CPT | Performed by: NURSE PRACTITIONER

## 2023-08-24 RX ORDER — LAMOTRIGINE 25 MG/1
25 TABLET ORAL DAILY
Qty: 30 TABLET | Refills: 0 | OUTPATIENT
Start: 2023-08-24

## 2023-08-24 RX ORDER — ALPRAZOLAM 0.5 MG/1
0.5 TABLET ORAL EVERY 12 HOURS
Qty: 60 TABLET | Refills: 0 | Status: SHIPPED | OUTPATIENT
Start: 2023-08-24

## 2023-08-24 RX ORDER — MONTELUKAST SODIUM 10 MG/1
10 TABLET ORAL NIGHTLY
Qty: 90 TABLET | Refills: 0 | Status: SHIPPED | OUTPATIENT
Start: 2023-08-24

## 2023-08-24 RX ORDER — ALBUTEROL SULFATE 90 UG/1
2 AEROSOL, METERED RESPIRATORY (INHALATION) EVERY 4 HOURS PRN
Qty: 18 G | Refills: 2 | Status: SHIPPED | OUTPATIENT
Start: 2023-08-24

## 2023-08-24 RX ORDER — BUSPIRONE HYDROCHLORIDE 5 MG/1
5 TABLET ORAL 3 TIMES DAILY
Qty: 90 TABLET | Refills: 0 | Status: SHIPPED | OUTPATIENT
Start: 2023-08-24

## 2023-08-24 RX ORDER — FOLIC ACID 1 MG/1
1000 TABLET ORAL DAILY
Qty: 90 TABLET | Refills: 0 | Status: SHIPPED | OUTPATIENT
Start: 2023-08-24

## 2023-08-24 RX ORDER — LANOLIN ALCOHOL/MO/W.PET/CERES
1 CREAM (GRAM) TOPICAL
Qty: 90 TABLET | Refills: 0 | Status: SHIPPED | OUTPATIENT
Start: 2023-08-24

## 2023-08-24 RX ORDER — AMOXICILLIN 500 MG/1
500 CAPSULE ORAL 2 TIMES DAILY
COMMUNITY
Start: 2023-07-03

## 2023-08-24 RX ORDER — LAMOTRIGINE 25 MG/1
25 TABLET ORAL EVERY 12 HOURS SCHEDULED
Qty: 60 TABLET | Refills: 0 | Status: SHIPPED | OUTPATIENT
Start: 2023-08-24 | End: 2023-08-24 | Stop reason: DRUGHIGH

## 2023-08-24 RX ORDER — IBUPROFEN 800 MG/1
800 TABLET ORAL EVERY 6 HOURS PRN
COMMUNITY
Start: 2023-08-21

## 2023-08-24 RX ORDER — ONDANSETRON 4 MG/1
4 TABLET, ORALLY DISINTEGRATING ORAL EVERY 8 HOURS PRN
Qty: 12 TABLET | Refills: 0 | Status: SHIPPED | OUTPATIENT
Start: 2023-08-24

## 2023-08-24 NOTE — TELEPHONE ENCOUNTER
Rx Refill Note    Alprazolam was sent to [facility] pharmacy. Please send to [facility] in [place].   Per Tanisha, pharmacist at [facility]. If the patient has not been on Lamictil in the past. Medication should be written for 1 day for at least 14 days. Verbal was given for 1 a day for 30 days per provider. Old order was d/c and new one pended.     Requested Prescriptions     Pending Prescriptions Disp Refills    ALPRAZolam (XANAX) 0.5 MG tablet 60 tablet 0     Sig: Take 1 tablet by mouth Every 12 (Twelve) Hours.    lamoTRIgine (LaMICtal) 25 MG tablet 30 tablet 0     Sig: Take 1 tablet by mouth Daily.      Last office visit with prescribing clinician: 8/24/2023   Last telemedicine visit with prescribing clinician: Visit date not found   Next office visit with prescribing clinician: 10/24/2023                         Would you like a call back once the refill request has been completed: [] Yes [] No    If the office needs to give you a call back, can they leave a voicemail: [] Yes [] No    Tisha Riley CMA  08/24/23, 16:31 EDTRx Refill Note    Tisha Riley CMA  08/24/23, 16:24 EDT

## 2023-08-24 NOTE — PROGRESS NOTES
Deanna Bah  [id number]  1995  female     08/24/2023      Chief Complaint  medication review     History of Present Illness  28 year old female patient presents today for medication refills and follow up on anxiety.  Patient states she was unable to get her Xanax filled that I sent in on July 27 due to insurance issues.  Patient agreed to leave [facility] today.  I reviewed up-to-date inspect report.  Patient states she was prescribed short course of hydrocodone by her dentist recently.  Patient states she is currently on amoxicillin that her dentist prescribed her.  Patient denies fever, chills, body aches, headache, lightheadedness, dizziness, sore throat, cough, abdominal pain, NVD, or any other symptom.  Patient states hydroxyzine does not seem to work well for her anxiety.  Patient agrees to try BuSpar.  Patient states she needs medication refills on all of her medicines.  Patient denies any other complaints or concerns today.  Patient states she will set up her annual wellness visit with me for her next appointment.    Review of Systems   Constitutional: Negative.    HENT: Negative.     Eyes: Negative.    Respiratory: Negative.     Cardiovascular: Negative.    Gastrointestinal: Negative.    Endocrine: Negative.    Genitourinary: Negative.    Musculoskeletal: Negative.    Skin: Negative.    Neurological: Negative.    Hematological: Negative.    Psychiatric/Behavioral: Negative.       Past Medical History:   Diagnosis Date    Anxiety     Asthma     Bipolar 1 disorder     Hypertension     Lordoscoliosis        Past Surgical History:   Procedure Laterality Date    KIDNEY STONE SURGERY      MOUTH SURGERY         Family History   Problem Relation Age of Onset    Hypertension Mother     Diabetes Mother     Liver cancer Father     Diabetes Sister     Liver cancer Maternal Grandmother     Aneurysm Maternal Grandfather     Breast cancer Paternal Grandmother        Social History     Socioeconomic History    Marital  "status: Single   Tobacco Use    Smoking status: Never    Smokeless tobacco: Never   Vaping Use    Vaping Use: Every day    Substances: Flavoring    Devices: Disposable   Substance and Sexual Activity    Alcohol use: Never    Sexual activity: Yes     Partners: Male        Allergies   Allergen Reactions    Latex Rash         Objective   Vital Signs:   /82 (BP Location: Left arm, Patient Position: Sitting, Cuff Size: Adult)   Pulse 74   Temp 98 øF (36.7 øC) (Temporal)   Resp 16   Ht 171.5 cm (67.5\")   Wt 84.8 kg (187 lb)   SpO2 99%   BMI 28.86 kg/mý       Physical Exam  Vitals and nursing note reviewed.   Constitutional:       General: She is not in acute distress.     Appearance: Normal appearance. She is not ill-appearing, toxic-appearing or diaphoretic.   HENT:      Head: Normocephalic and atraumatic.      Jaw: There is normal jaw occlusion.      Right Ear: Hearing and external ear normal.      Left Ear: Hearing and external ear normal.      Nose: Nose normal.      Mouth/Throat:      Lips: Pink.   Eyes:      General: Lids are normal. Vision grossly intact. Gaze aligned appropriately.      Extraocular Movements: Extraocular movements intact.      Conjunctiva/sclera: Conjunctivae normal.      Pupils: Pupils are equal, round, and reactive to light.   Cardiovascular:      Rate and Rhythm: Normal rate and regular rhythm.      Pulses: Normal pulses.           Carotid pulses are 2+ on the right side and 2+ on the left side.       Radial pulses are 2+ on the right side and 2+ on the left side.        Dorsalis pedis pulses are 2+ on the right side and 2+ on the left side.        Posterior tibial pulses are 2+ on the right side and 2+ on the left side.      Heart sounds: Normal heart sounds, S1 normal and S2 normal. No murmur heard.  Pulmonary:      Effort: Pulmonary effort is normal.      Breath sounds: Normal breath sounds and air entry.   Abdominal:      General: Abdomen is flat. Bowel sounds are normal. There " is no distension or abdominal bruit.      Palpations: Abdomen is soft.      Tenderness: There is no abdominal tenderness.   Musculoskeletal:         General: Normal range of motion.      Cervical back: Full passive range of motion without pain, normal range of motion and neck supple.      Right lower leg: No edema.      Left lower leg: No edema.   Skin:     General: Skin is warm and dry.      Capillary Refill: Capillary refill takes less than 2 seconds.      Coloration: Skin is not cyanotic or pale.      Findings: No bruising, erythema or rash.   Neurological:      General: No focal deficit present.      Mental Status: She is alert and oriented to person, place, and time. Mental status is at baseline.      GCS: GCS eye subscore is 4. GCS verbal subscore is 5. GCS motor subscore is 6.      Cranial Nerves: Cranial nerves 2-12 are intact. No cranial nerve deficit.      Sensory: Sensation is intact. No sensory deficit.      Motor: Motor function is intact. No weakness.      Coordination: Coordination is intact. Coordination normal.      Gait: Gait is intact. Gait normal.      Deep Tendon Reflexes: Reflexes normal.   Psychiatric:         Attention and Perception: Attention and perception normal.         Mood and Affect: Affect normal. Mood is anxious (mildly). Mood is not elated. Affect is not blunt, angry or inappropriate.         Speech: Speech normal.         Behavior: Behavior normal. Behavior is cooperative.         Thought Content: Thought content normal.         Cognition and Memory: Cognition and memory normal.         Judgment: Judgment normal.               Assessment and Plan   Diagnoses and all orders for this visit:    1. Anxiety (Primary)  Comments:  DC'd hydroxyzine.  Starting BuSpar 5 mg 3 times daily.  Continue Xanax 0.5 mg twice daily as needed.  Follow-up in 3 months.  Orders:  -     busPIRone (BUSPAR) 5 MG tablet; Take 1 tablet by mouth 3 (Three) Times a Day.  Dispense: 90 tablet; Refill: 0    2.  Medication refill  -     vitamin D3 (Vitamin D) 125 MCG (5000 UT) capsule capsule; Take 1 capsule by mouth Daily.  Dispense: 30 capsule; Refill: 1  -     ondansetron ODT (ZOFRAN-ODT) 4 MG disintegrating tablet; Place 1 tablet on the tongue Every 8 (Eight) Hours As Needed for Nausea or Vomiting.  Dispense: 12 tablet; Refill: 0  -     montelukast (SINGULAIR) 10 MG tablet; Take 1 tablet by mouth Every Night.  Dispense: 90 tablet; Refill: 0  -     lamoTRIgine (LaMICtal) 25 MG tablet; Take 1 tablet by mouth Every 12 (Twelve) Hours.  Dispense: 60 tablet; Refill: 0  -     folic acid (FOLVITE) 1 MG tablet; Take 1 tablet by mouth Daily.  Dispense: 90 tablet; Refill: 0  -     ferrous sulfate 325 (65 FE) MG EC tablet; Take 1 tablet by mouth Daily With Breakfast.  Dispense: 90 tablet; Refill: 0  -     albuterol sulfate  (90 Base) MCG/ACT inhaler; Inhale 2 puffs Every 4 (Four) Hours As Needed for Wheezing.  Dispense: 18 g; Refill: 2    3. Medication management  -     POC Urine Drug Screen, Triage    4. Chronic prescription benzodiazepine use  -     POC Urine Drug Screen, Triage    5. ADHD (attention deficit hyperactivity disorder), combined type  Assessment & Plan:  Stable.  Continue current dose of Vyvanse.  Updated UDS today.      6. Vitamin D deficiency  -     vitamin D3 (Vitamin D) 125 MCG (5000 UT) capsule capsule; Take 1 capsule by mouth Daily.  Dispense: 30 capsule; Refill: 1    7. Mild intermittent asthma without complication  Comments:  Stable.  Refilled Singulair and albuterol inhaler PRN.  Orders:  -     montelukast (SINGULAIR) 10 MG tablet; Take 1 tablet by mouth Every Night.  Dispense: 90 tablet; Refill: 0  -     albuterol sulfate  (90 Base) MCG/ACT inhaler; Inhale 2 puffs Every 4 (Four) Hours As Needed for Wheezing.  Dispense: 18 g; Refill: 2    8. Iron deficiency anemia secondary to inadequate dietary iron intake  Comments:  Patient on ferrous sulfate.  Refilled ferrous sulfate today.  Orders:  -      folic acid (FOLVITE) 1 MG tablet; Take 1 tablet by mouth Daily.  Dispense: 90 tablet; Refill: 0  -     ferrous sulfate 325 (65 FE) MG EC tablet; Take 1 tablet by mouth Daily With Breakfast.  Dispense: 90 tablet; Refill: 0    9. Nausea  Comments:  Refilled Zofran.  Orders:  -     ondansetron ODT (ZOFRAN-ODT) 4 MG disintegrating tablet; Place 1 tablet on the tongue Every 8 (Eight) Hours As Needed for Nausea or Vomiting.  Dispense: 12 tablet; Refill: 0    10. Bipolar depression  Assessment & Plan:  Stable. Continue current dose of Lamictal.     Orders:  -     lamoTRIgine (LaMICtal) 25 MG tablet; Take 1 tablet by mouth Every 12 (Twelve) Hours.  Dispense: 60 tablet; Refill: 0        Follow Up   Return in about 3 months (around 11/24/2023) for Annual physical.    There are no Patient Instructions on file for this visit.

## 2023-08-24 NOTE — ASSESSMENT & PLAN NOTE
DC'd hydroxyzine.  Starting BuSpar 5 mg 3 times daily.  Continue Xanax 0.5 mg twice daily as needed.  Follow-up in 3 months.

## 2023-08-24 NOTE — TELEPHONE ENCOUNTER
Called and spoke with patient regarding medication concern(s). Patient was informed that the Alpralozam was sent to [facility] in [place]. That was causing conflict with getting filled. She was informed that script was canceled. A new one was sent into [facility]. She was informed that she can use [facility] to purchase medication if insurance is an issue per pharmacist (Tanisha).  Patient also was informed that the Lamotrigine was changed to once daily for 30 days per pharmacist & physician.   Patient voiced understanding with no further questions at this time.

## 2023-09-05 ENCOUNTER — TELEPHONE (OUTPATIENT)
Dept: FAMILY MEDICINE CLINIC | Facility: CLINIC | Age: 28
End: 2023-09-05

## 2023-09-05 NOTE — TELEPHONE ENCOUNTER
Caller: Deanna Bah    Relationship: Self    Best call back number:     Deanna Bah (Self) [phone] (Mobile)       What orders are you requesting (i.e. lab or imaging): ULTRASOUND TO CHECK FOR ULCERS       In what timeframe would the patient need to come in: ASAP     Where will you receive your lab/imaging services: [facility]     Additional notes: PLEASE CALL AND ADVISE

## 2023-09-08 ENCOUNTER — OFFICE VISIT (OUTPATIENT)
Dept: FAMILY MEDICINE CLINIC | Facility: CLINIC | Age: 28
End: 2023-09-08
Payer: MEDICAID

## 2023-09-08 ENCOUNTER — TELEPHONE (OUTPATIENT)
Dept: FAMILY MEDICINE CLINIC | Facility: CLINIC | Age: 28
End: 2023-09-08

## 2023-09-08 VITALS
HEIGHT: 68 IN | TEMPERATURE: 98.6 F | SYSTOLIC BLOOD PRESSURE: 123 MMHG | HEART RATE: 78 BPM | OXYGEN SATURATION: 98 % | WEIGHT: 190.8 LBS | BODY MASS INDEX: 28.92 KG/M2 | DIASTOLIC BLOOD PRESSURE: 74 MMHG

## 2023-09-08 DIAGNOSIS — Z23 NEED FOR PNEUMOCOCCAL VACCINATION: ICD-10-CM

## 2023-09-08 DIAGNOSIS — Z20.818 EXPOSURE TO STREP THROAT: ICD-10-CM

## 2023-09-08 DIAGNOSIS — H65.93 BILATERAL OTITIS MEDIA WITH EFFUSION: ICD-10-CM

## 2023-09-08 DIAGNOSIS — Z32.00 ENCOUNTER FOR PREGNANCY TEST, RESULT UNKNOWN: ICD-10-CM

## 2023-09-08 DIAGNOSIS — Z23 NEED FOR VACCINE FOR TD (TETANUS-DIPHTHERIA): ICD-10-CM

## 2023-09-08 DIAGNOSIS — Z20.822 CLOSE EXPOSURE TO COVID-19 VIRUS: ICD-10-CM

## 2023-09-08 DIAGNOSIS — R10.11 RIGHT UPPER QUADRANT ABDOMINAL PAIN: Primary | ICD-10-CM

## 2023-09-08 DIAGNOSIS — F90.2 ADHD (ATTENTION DEFICIT HYPERACTIVITY DISORDER), COMBINED TYPE: ICD-10-CM

## 2023-09-08 DIAGNOSIS — F90.2 ADHD (ATTENTION DEFICIT HYPERACTIVITY DISORDER), COMBINED TYPE: Primary | ICD-10-CM

## 2023-09-08 LAB
B-HCG UR QL: NEGATIVE
BILIRUB BLD-MCNC: NEGATIVE MG/DL
CLARITY, POC: CLEAR
COLOR UR: ABNORMAL
EXPIRATION DATE: ABNORMAL
EXPIRATION DATE: NORMAL
FLUAV AG UPPER RESP QL IA.RAPID: NOT DETECTED
FLUBV AG UPPER RESP QL IA.RAPID: NOT DETECTED
GLUCOSE UR STRIP-MCNC: NEGATIVE MG/DL
INTERNAL CONTROL: NORMAL
INTERNAL CONTROL: NORMAL
INTERNAL NEGATIVE CONTROL: NORMAL
INTERNAL POSITIVE CONTROL: NORMAL
KETONES UR QL: NEGATIVE
LEUKOCYTE EST, POC: NEGATIVE
Lab: ABNORMAL
Lab: NORMAL
NITRITE UR-MCNC: NEGATIVE MG/ML
PH UR: 6 [PH] (ref 5–8)
PROT UR STRIP-MCNC: NEGATIVE MG/DL
RBC # UR STRIP: ABNORMAL /UL
S PYO AG THROAT QL: NEGATIVE
SARS-COV-2 AG UPPER RESP QL IA.RAPID: NOT DETECTED
SP GR UR: 1.03 (ref 1–1.03)
UROBILINOGEN UR QL: ABNORMAL

## 2023-09-08 RX ORDER — METHYLPHENIDATE HYDROCHLORIDE 36 MG/1
36 TABLET ORAL EVERY MORNING
Qty: 30 TABLET | Refills: 0 | Status: SHIPPED | OUTPATIENT
Start: 2023-09-08 | End: 2023-09-11 | Stop reason: RX

## 2023-09-08 RX ORDER — AMOXICILLIN 875 MG/1
875 TABLET, COATED ORAL 2 TIMES DAILY
Qty: 20 TABLET | Refills: 0 | Status: SHIPPED | OUTPATIENT
Start: 2023-09-08 | End: 2023-09-18

## 2023-09-08 RX ORDER — FLUTICASONE PROPIONATE 50 MCG
2 SPRAY, SUSPENSION (ML) NASAL DAILY
Qty: 18.2 ML | Refills: 0 | Status: SHIPPED | OUTPATIENT
Start: 2023-09-08

## 2023-09-08 RX ORDER — LORATADINE 10 MG/1
10 TABLET ORAL DAILY
Qty: 30 TABLET | Refills: 0 | Status: SHIPPED | OUTPATIENT
Start: 2023-09-08

## 2023-09-08 NOTE — TELEPHONE ENCOUNTER
Caller: Deanna Bah    Relationship: Self    Best call back number:     [phone] (Mobile)       What medication are you requesting: CANNOT FIND CONCERTA AT THE LOCAL PHARMACIES, AND WANTED TO KNOW IF YOU COULD CHANGE TO DIFFERENT MEDICATION     SHE IS OUT OF MEDS ALSO      Have you had these symptoms before:    [x] Yes  [] No    Have you been treated for these symptoms before:   [x] Yes  [] No    If a prescription is needed, what is your preferred pharmacy and phone number:      Additional notes:

## 2023-09-08 NOTE — PROGRESS NOTES
"Deanna Bah  [id number]  1995  female     09/08/2023      Chief Complaint  Abdominal Pain (X2 weeks with nausea / dry heaving. States this happens with \"just about anything she eats or drinks\" increases when she has foods with a lot of acid in them.) and Medication Problem (States her Vyvanse has been making her feel \"high and unable to move\" would like to discuss alternatives.)    History of Present Illness  28-year-old female patient presents today complaint of right upper quadrant abdominal pain x2 weeks with nausea and dry heaving.  Patient states this happens with just about anything she eats or drinks.  Patient states symptoms worsen when eating foods with a lot of acid in them.  Patient has concerns about her gallbladder.  Patient states she had gallbladder issues prior to her last pregnancy roughly 4 years ago.  Patient complains of occasional nausea with this and takes Zofran which seems to help.  Patient states she just obtained a refill on her Zofran.  Patient also complains of headache for 2 weeks but denies fever, chills, body aches, lightheadedness, dizziness, numbness, tingling, ear pain, sore throat, cough, chest pain, diarrhea.  Patient states her Vyvanse has been making her feel \"high and unable to move\" and would like to discuss alternatives.  Ran up-to-date inspect report.  Discussed ordering CT scan due to her complaints of abdominal pain today, patient unsure of pregnancy status and requesting to check a urinalysis while she is here.  Patient states her son was treated for strep roughly 3 weeks ago.  Patient states she was exposed to COVID this past Wednesday, states the person that interviewed her at a new job was diagnosed with COVID recently.  Patient states she would like to be tested for strep, flu, COVID today.  Abdominal Pain  Associated symptoms include headaches and nausea. Pertinent negatives include no constipation, diarrhea or vomiting.     Review of Systems " "  Constitutional: Negative.    HENT:  Negative for congestion, ear pain, postnasal drip, rhinorrhea, sinus pressure, sinus pain, sneezing, sore throat, tinnitus, trouble swallowing and voice change.    Eyes: Negative.    Respiratory: Negative.     Cardiovascular: Negative.    Gastrointestinal:  Positive for abdominal pain and nausea. Negative for abdominal distention, anal bleeding, blood in stool, constipation, diarrhea, rectal pain and vomiting.   Endocrine: Negative.    Genitourinary: Negative.    Musculoskeletal: Negative.    Skin: Negative.    Neurological:  Positive for headaches.   Hematological: Negative.    Psychiatric/Behavioral: Negative.       Past Medical History:   Diagnosis Date    Anxiety     Asthma     Bipolar 1 disorder     Hypertension     Lordoscoliosis        Past Surgical History:   Procedure Laterality Date    KIDNEY STONE SURGERY      MOUTH SURGERY         Family History   Problem Relation Age of Onset    Hypertension Mother     Diabetes Mother     Liver cancer Father     Diabetes Sister     Liver cancer Maternal Grandmother     Aneurysm Maternal Grandfather     Breast cancer Paternal Grandmother        Social History     Socioeconomic History    Marital status: Single   Tobacco Use    Smoking status: Never    Smokeless tobacco: Never   Vaping Use    Vaping Use: Every day    Substances: Flavoring    Devices: Disposable   Substance and Sexual Activity    Alcohol use: Never    Sexual activity: Yes     Partners: Male        Allergies   Allergen Reactions    Latex Rash         Objective   Vital Signs:   /74 (BP Location: Left arm, Patient Position: Sitting, Cuff Size: Large Adult)   Pulse 78   Temp 98.6 °F (37 °C) (Temporal)   Ht 171.5 cm (67.5\")   Wt 86.5 kg (190 lb 12.8 oz)   SpO2 98%   BMI 29.44 kg/m²       Physical Exam  Vitals and nursing note reviewed.   Constitutional:       General: She is not in acute distress.     Appearance: Normal appearance. She is not ill-appearing, " toxic-appearing or diaphoretic.   HENT:      Head: Normocephalic and atraumatic.      Jaw: There is normal jaw occlusion.      Right Ear: Hearing, ear canal and external ear normal. A middle ear effusion is present. Tympanic membrane is erythematous and bulging.      Left Ear: Hearing, ear canal and external ear normal. A middle ear effusion is present. Tympanic membrane is erythematous and bulging.      Nose: Nose normal.      Mouth/Throat:      Lips: Pink.      Pharynx: Oropharynx is clear. Uvula midline. Posterior oropharyngeal erythema present.      Tonsils: 0 on the right. 0 on the left.   Eyes:      General: Lids are normal. Vision grossly intact. Gaze aligned appropriately.      Extraocular Movements: Extraocular movements intact.      Conjunctiva/sclera: Conjunctivae normal.      Pupils: Pupils are equal, round, and reactive to light.   Cardiovascular:      Rate and Rhythm: Normal rate and regular rhythm.      Pulses: Normal pulses.           Carotid pulses are 2+ on the right side and 2+ on the left side.       Radial pulses are 2+ on the right side and 2+ on the left side.        Dorsalis pedis pulses are 2+ on the right side and 2+ on the left side.        Posterior tibial pulses are 2+ on the right side and 2+ on the left side.      Heart sounds: Normal heart sounds, S1 normal and S2 normal. No murmur heard.  Pulmonary:      Effort: Pulmonary effort is normal.      Breath sounds: Normal breath sounds and air entry.   Abdominal:      General: Abdomen is flat. Bowel sounds are normal. There is no distension or abdominal bruit.      Palpations: Abdomen is soft.      Tenderness: There is abdominal tenderness in the right upper quadrant. There is no guarding or rebound.      Hernia: No hernia is present.   Musculoskeletal:         General: Normal range of motion.      Cervical back: Full passive range of motion without pain, normal range of motion and neck supple.      Right lower leg: No edema.      Left  lower leg: No edema.   Skin:     General: Skin is warm and dry.      Capillary Refill: Capillary refill takes less than 2 seconds.      Coloration: Skin is not cyanotic or pale.      Findings: No bruising, erythema or rash.   Neurological:      General: No focal deficit present.      Mental Status: She is alert and oriented to person, place, and time. Mental status is at baseline.      GCS: GCS eye subscore is 4. GCS verbal subscore is 5. GCS motor subscore is 6.      Cranial Nerves: Cranial nerves 2-12 are intact. No cranial nerve deficit.      Sensory: Sensation is intact. No sensory deficit.      Motor: Motor function is intact. No weakness.      Coordination: Coordination is intact. Coordination normal.      Gait: Gait is intact. Gait normal.      Deep Tendon Reflexes: Reflexes normal.   Psychiatric:         Attention and Perception: Attention and perception normal.         Mood and Affect: Mood and affect normal.         Speech: Speech normal.         Behavior: Behavior normal. Behavior is cooperative.         Thought Content: Thought content normal.         Cognition and Memory: Cognition and memory normal.         Judgment: Judgment normal.               Assessment and Plan   Diagnoses and all orders for this visit:    1. Right upper quadrant abdominal pain (Primary)  Comments:  UA negative.  Pending abdominal CT scan.  Referral sent to general surgeon for follow-up.  Orders:  -     Ambulatory Referral to General Surgery  -     CT Abdomen With & Without Contrast; Future  -     POCT urinalysis dipstick, automated    2. ADHD (attention deficit hyperactivity disorder), combined type  Assessment & Plan:  DC'd Vyvanse due to side effects.  Starting 36 mg Concerta daily.  Follow-up in 1 month.    Orders:  -     methylphenidate (Concerta) 36 MG CR tablet; Take 1 tablet by mouth Every Morning  Dispense: 30 tablet; Refill: 0    3. Bilateral otitis media with effusion  Comments:  Treating with amoxicillin, Claritin,  Flonase.  Orders:  -     amoxicillin (AMOXIL) 875 MG tablet; Take 1 tablet by mouth 2 (Two) Times a Day for 10 days.  Dispense: 20 tablet; Refill: 0  -     loratadine (Claritin) 10 MG tablet; Take 1 tablet by mouth Daily.  Dispense: 30 tablet; Refill: 0  -     fluticasone (FLONASE) 50 MCG/ACT nasal spray; 2 sprays into the nostril(s) as directed by provider Daily.  Dispense: 18.2 mL; Refill: 0    4. Need for vaccine for Td (tetanus-diphtheria)  -     Tdap Vaccine => 8yo IM (BOOSTRIX)    5. Need for pneumococcal vaccination  -     Cancel: Pneumococcal Conjugate Vaccine 20-Valent (PCV20)  -     pneumococcal polysaccharide 23-valent (PNEUMOVAX-23) vaccine 0.5 mL    6. Encounter for pregnancy test, result unknown  Comments:  Urine hCG negative.  Orders:  -     POCT pregnancy, urine    7. Close exposure to COVID-19 virus  Comments:  Flu/COVID-negative today.  Orders:  -     POCT SARS-CoV-2 Antigen JULIETA + Flu    8. Exposure to strep throat  Comments:  Rapid strep negative today.  Orders:  -     POCT rapid strep A        Follow Up   Return in about 3 months (around 12/8/2023) for Recheck.    There are no Patient Instructions on file for this visit.

## 2023-09-11 ENCOUNTER — TELEPHONE (OUTPATIENT)
Dept: FAMILY MEDICINE CLINIC | Facility: CLINIC | Age: 28
End: 2023-09-11
Payer: MEDICAID

## 2023-09-11 RX ORDER — METHYLPHENIDATE HYDROCHLORIDE 30 MG/1
30 CAPSULE, EXTENDED RELEASE ORAL EVERY MORNING
Qty: 30 CAPSULE | Refills: 0 | Status: SHIPPED | OUTPATIENT
Start: 2023-09-11

## 2023-09-11 NOTE — TELEPHONE ENCOUNTER
Pt called stating she has been throwing up ever since receiving the pneumonia, DTAP injections. EMERALD

## 2023-09-12 NOTE — TELEPHONE ENCOUNTER
Phoned pt in regards to her msg of vomiting after receiving the pneumovax inj. The pt stated she has been taking Zofran with no relief. The pt was instructed to push Gatorade. The pt was instructed to go to UC , if her symptoms doesn't subside. The pt voiced understanding of instructions given.

## 2023-09-19 ENCOUNTER — HOSPITAL ENCOUNTER (OUTPATIENT)
Dept: PET IMAGING | Facility: HOSPITAL | Age: 28
Discharge: HOME | End: 2023-09-19
Admitting: NURSE PRACTITIONER
Payer: MEDICAID

## 2023-09-19 DIAGNOSIS — R10.11 RIGHT UPPER QUADRANT ABDOMINAL PAIN: ICD-10-CM

## 2023-09-19 DIAGNOSIS — N20.0 RENAL CALCULUS, RIGHT: Primary | ICD-10-CM

## 2023-09-19 PROCEDURE — 74170 CT ABD WO CNTRST FLWD CNTRST: CPT

## 2023-09-19 PROCEDURE — 25510000001 IOPAMIDOL PER 1 ML: Performed by: NURSE PRACTITIONER

## 2023-09-19 RX ADMIN — IOPAMIDOL 100 ML: 755 INJECTION, SOLUTION INTRAVENOUS at 10:56

## 2023-09-20 ENCOUNTER — TELEPHONE (OUTPATIENT)
Dept: FAMILY MEDICINE CLINIC | Facility: CLINIC | Age: 28
End: 2023-09-20

## 2023-09-20 NOTE — TELEPHONE ENCOUNTER
Spoke with patient in regards to her CT results. Advised pt to call Dr. Pastrana with [facility] (as he did her previous surgery) pt agreed at the time of the phone call.

## 2023-09-20 NOTE — TELEPHONE ENCOUNTER
Caller: Deanna Bah    Relationship: Self    Best call back number:     Goodman Deanna BUSH (Self) [phone] (Mobile)       Caller requesting test results:     What test was performed: CT SCANS    When was the test performed: YESTERDAY    Where was the test performed: [facility]    Additional notes:

## 2023-09-21 ENCOUNTER — TELEPHONE (OUTPATIENT)
Dept: FAMILY MEDICINE CLINIC | Facility: CLINIC | Age: 28
End: 2023-09-21
Payer: MEDICAID

## 2023-09-21 DIAGNOSIS — N20.0 RENAL CALCULUS, RIGHT: Primary | ICD-10-CM

## 2023-09-21 NOTE — TELEPHONE ENCOUNTER
Lulú with [facility] called in regards to a referral they received for the pt. Lulú states the pt has been dismissed from the practise, due to too many no shows. EMERALD

## 2023-09-25 ENCOUNTER — TELEPHONE (OUTPATIENT)
Dept: FAMILY MEDICINE CLINIC | Facility: CLINIC | Age: 28
End: 2023-09-25

## 2023-09-25 DIAGNOSIS — D50.8 IRON DEFICIENCY ANEMIA SECONDARY TO INADEQUATE DIETARY IRON INTAKE: ICD-10-CM

## 2023-09-25 DIAGNOSIS — J45.20 MILD INTERMITTENT ASTHMA WITHOUT COMPLICATION: ICD-10-CM

## 2023-09-25 DIAGNOSIS — F41.0 PANIC ATTACKS: ICD-10-CM

## 2023-09-25 DIAGNOSIS — Z76.0 MEDICATION REFILL: ICD-10-CM

## 2023-09-25 DIAGNOSIS — F90.2 ADHD (ATTENTION DEFICIT HYPERACTIVITY DISORDER), COMBINED TYPE: ICD-10-CM

## 2023-09-25 DIAGNOSIS — E55.9 VITAMIN D DEFICIENCY: ICD-10-CM

## 2023-09-25 RX ORDER — METHYLPHENIDATE HYDROCHLORIDE 30 MG/1
30 CAPSULE, EXTENDED RELEASE ORAL EVERY MORNING
Qty: 30 CAPSULE | Refills: 0 | OUTPATIENT
Start: 2023-09-25

## 2023-09-25 RX ORDER — MONTELUKAST SODIUM 10 MG/1
10 TABLET ORAL NIGHTLY
Qty: 90 TABLET | Refills: 0 | Status: SHIPPED | OUTPATIENT
Start: 2023-09-25

## 2023-09-25 RX ORDER — FOLIC ACID 1 MG/1
1000 TABLET ORAL DAILY
Qty: 90 TABLET | Refills: 0 | Status: SHIPPED | OUTPATIENT
Start: 2023-09-25

## 2023-09-25 RX ORDER — ALPRAZOLAM 0.5 MG/1
0.5 TABLET ORAL EVERY 12 HOURS
Qty: 60 TABLET | Refills: 0 | Status: SHIPPED | OUTPATIENT
Start: 2023-09-25

## 2023-09-25 RX ORDER — IBUPROFEN 800 MG/1
800 TABLET ORAL EVERY 6 HOURS PRN
Qty: 30 TABLET | Refills: 0 | Status: SHIPPED | OUTPATIENT
Start: 2023-09-25

## 2023-09-25 NOTE — TELEPHONE ENCOUNTER
Caller: Deanna aBh    Relationship: Self    Best call back number: [phone]    What does billing need from the patient: PATIENT CALLED AND SAID THAT HER MEDICATIONS ARE NOT BEING COVERED BY HER INSURANCE PLAN, ITS A MEDICAID PLAN    SHE SAID SHE'S SPOKEN TO INSURANCE ABOUT IT, AND THEY SAY THAT IT COVERS THE MEDICATIONS, BUT THE PHARMACY TELLS HER THE PLAN DOES NOT SHOW AS ACTIVE     SHE SAID THAT SHE HAD REQUESTED REFILLS, WHICH WERE ON 09/24 AND 09/25     SHE IS WANTING TO SEE IF THE OFFICE HAS ANY SAMPLES OF THOSE MEDICATIONS TO HELP HER, OR IF THEY KNOW WHY THE PHARMACY IS SAYING HER INSURANCE IS NOT COVERING THEM

## 2023-09-25 NOTE — TELEPHONE ENCOUNTER
Rx Refill Note  Requested Prescriptions     Pending Prescriptions Disp Refills    ALPRAZolam (XANAX) 0.5 MG tablet 60 tablet 0     Sig: Take 1 tablet by mouth Every 12 (Twelve) Hours.    folic acid (FOLVITE) 1 MG tablet 90 tablet 0     Sig: Take 1 tablet by mouth Daily.    methylphenidate LA (Ritalin LA) 30 MG 24 hr capsule 30 capsule 0     Sig: Take 1 capsule by mouth Every Morning    vitamin D3 (Vitamin D) 125 MCG (5000 UT) capsule capsule 30 capsule 1     Sig: Take 1 capsule by mouth Daily.    ibuprofen (ADVIL,MOTRIN) 800 MG tablet       Sig: Take 1 tablet by mouth Every 6 (Six) Hours As Needed.    montelukast (SINGULAIR) 10 MG tablet 90 tablet 0     Sig: Take 1 tablet by mouth Every Night.      Last office visit with prescribing clinician: 9/8/2023   Last telemedicine visit with prescribing clinician: Visit date not found   Next office visit with prescribing clinician: 10/10/2023                         Would you like a call back once the refill request has been completed: [] Yes [] No    If the office needs to give you a call back, can they leave a voicemail: [] Yes [] No    Mariana Wilson MA  09/25/23, 11:37 EDT    INSPECT - KELLEY TEIXEIRA / INSPECT (09/25/2023)

## 2023-09-27 ENCOUNTER — TELEPHONE (OUTPATIENT)
Dept: FAMILY MEDICINE CLINIC | Facility: CLINIC | Age: 28
End: 2023-09-27
Payer: MEDICAID

## 2023-09-27 DIAGNOSIS — R10.9 FLANK PAIN: Primary | ICD-10-CM

## 2023-09-27 NOTE — TELEPHONE ENCOUNTER
Caller: Deanna Bah    Relationship: Self    Best call back number: [phone]     What medication are you requesting: PAIN MEDICATION    What are your current symptoms: KIDNEY STONES    How long have you been experiencing symptoms:     Have you had these symptoms before:    [x] Yes  [] No    Have you been treated for these symptoms before:   [x] Yes  [] No    If a prescription is needed, what is your preferred pharmacy and phone number: [place] [id number] [facility] [phone] [facility] [phone]      Additional notes:PATIENT STATES THAT THE IBUPROFEN ARE NOT HELPING AND WOULD LIKE SOMETHING ELSE TO GET WITH THE PAIN.

## 2023-09-28 RX ORDER — NAPROXEN SODIUM 550 MG/1
550 TABLET ORAL 2 TIMES DAILY WITH MEALS
Qty: 10 TABLET | Refills: 0 | Status: SHIPPED | OUTPATIENT
Start: 2023-09-28

## 2023-10-02 ENCOUNTER — TELEPHONE (OUTPATIENT)
Dept: FAMILY MEDICINE CLINIC | Facility: CLINIC | Age: 28
End: 2023-10-02

## 2023-10-02 NOTE — TELEPHONE ENCOUNTER
Caller: DIANA    Relationship: Other    Best call back number: [phone]      DIANA CALLED, WITH [facility], REGARDING A REFERRAL THEY RECEIVED ON THIS PATIENT.    THIS OFFICE DOES NOT TREAT PATIENT'S FOR ABDOMINAL PAIN.    THE PATIENT HAD IMAGING TAKEN TO DETERMINE THAT SHE HAS KIDNEY STONES.    THEY SAID PATIENT WOULD NEED TO BE REFERRAL TO UROLOGY INSTEAD.    IF THE UROLOGIST DETERMINES IT IS KIDNEY STONES, BUT IS NOT WHAT IS CAUSING PATIENT'S ABDOMINAL PAIN, THEN THEY SUGGEST AN ULTRASOUND OF THE GALLBLADDER.    PLEASE ADVISE

## 2023-10-04 NOTE — TELEPHONE ENCOUNTER
Patient has been advised more than once to follow up with her urologist and if she did not have a urologist then a referral would be placed. We have yet to hear from pt on if she needs a referral or not.

## 2023-10-10 ENCOUNTER — TELEPHONE (OUTPATIENT)
Dept: FAMILY MEDICINE CLINIC | Facility: CLINIC | Age: 28
End: 2023-10-10

## 2023-10-10 ENCOUNTER — NURSE TRIAGE (OUTPATIENT)
Dept: CALL CENTER | Facility: HOSPITAL | Age: 28
End: 2023-10-10
Payer: MEDICAID

## 2023-10-10 ENCOUNTER — OFFICE VISIT (OUTPATIENT)
Dept: FAMILY MEDICINE CLINIC | Facility: CLINIC | Age: 28
End: 2023-10-10
Payer: MEDICAID

## 2023-10-10 VITALS
WEIGHT: 191.8 LBS | HEART RATE: 75 BPM | HEIGHT: 68 IN | OXYGEN SATURATION: 97 % | BODY MASS INDEX: 29.07 KG/M2 | TEMPERATURE: 98.4 F | SYSTOLIC BLOOD PRESSURE: 107 MMHG | DIASTOLIC BLOOD PRESSURE: 71 MMHG

## 2023-10-10 DIAGNOSIS — H65.93 MIDDLE EAR EFFUSION, BILATERAL: ICD-10-CM

## 2023-10-10 DIAGNOSIS — R10.9 RIGHT SIDED ABDOMINAL PAIN: Primary | ICD-10-CM

## 2023-10-10 DIAGNOSIS — N20.0 RENAL CALCULUS, RIGHT: ICD-10-CM

## 2023-10-10 PROCEDURE — 1160F RVW MEDS BY RX/DR IN RCRD: CPT | Performed by: NURSE PRACTITIONER

## 2023-10-10 PROCEDURE — T1015 CLINIC SERVICE: HCPCS | Performed by: NURSE PRACTITIONER

## 2023-10-10 PROCEDURE — 1159F MED LIST DOCD IN RCRD: CPT | Performed by: NURSE PRACTITIONER

## 2023-10-10 PROCEDURE — 99214 OFFICE O/P EST MOD 30 MIN: CPT | Performed by: NURSE PRACTITIONER

## 2023-10-10 RX ORDER — CETIRIZINE HYDROCHLORIDE 10 MG/1
10 TABLET ORAL NIGHTLY
Qty: 30 TABLET | Refills: 2 | Status: SHIPPED | OUTPATIENT
Start: 2023-10-10

## 2023-10-10 RX ORDER — KETOROLAC TROMETHAMINE 10 MG/1
10 TABLET, FILM COATED ORAL EVERY 8 HOURS PRN
Qty: 20 TABLET | Refills: 0 | Status: SHIPPED | OUTPATIENT
Start: 2023-10-10

## 2023-10-10 NOTE — TELEPHONE ENCOUNTER
Caller: Deanna Bah    Relationship: Self    Best call back number: [phone]       What was the call regarding: PATIENT WANTED TO INFORM CLINICAL THAT SHE IS GOING TO [facility] FOR BLEEDING     PLEASE ADVISE

## 2023-10-10 NOTE — PROGRESS NOTES
Deanna Bah  [id number]  1995  female     10/10/2023      Chief Complaint  Abdominal Pain (Pain has increased since last visit )    History of Present Illness  28-year-old female patient presents today to follow-up on her abdominal pain.  Patient states she has had ongoing right-sided abdominal pain that wraps around to her right flank area.  Patient states she went to see her urologist as instructed and received a call from [facility] prior to her appointment and was notified that that urologist she was scheduled with does not treat kidney stones.  Patient does complain of occasional ear pain and ear pressure.  Patient denies fever, chills, body aches, headache, lightheadedness, dizziness, numbness, tingling, cough, shortness of breath, chest pain, NVD, dysuria, hematuria, urinary frequency, urinary urgency, pelvic pain, or any other symptom.  Abdominal Pain  Pertinent negatives include no constipation, diarrhea, nausea or vomiting.       Review of Systems   Constitutional: Negative.    HENT:  Positive for ear pain. Negative for congestion, ear discharge, hearing loss, mouth sores, postnasal drip, rhinorrhea, sinus pressure, sinus pain, sneezing, sore throat, tinnitus and trouble swallowing.    Eyes: Negative.    Respiratory: Negative.     Cardiovascular: Negative.    Gastrointestinal:  Positive for abdominal pain. Negative for abdominal distention, anal bleeding, blood in stool, constipation, diarrhea, nausea, rectal pain and vomiting.   Endocrine: Negative.    Genitourinary: Negative.    Musculoskeletal: Negative.    Skin: Negative.    Neurological: Negative.    Hematological: Negative.    Psychiatric/Behavioral: Negative.         Past Medical History:   Diagnosis Date    Anxiety     Asthma     Bipolar 1 disorder     Hypertension     Lordoscoliosis        Past Surgical History:   Procedure Laterality Date    KIDNEY STONE SURGERY      MOUTH SURGERY         Family History  "  Problem Relation Age of Onset    Hypertension Mother     Diabetes Mother     Liver cancer Father     Diabetes Sister     Liver cancer Maternal Grandmother     Aneurysm Maternal Grandfather     Breast cancer Paternal Grandmother        Social History     Socioeconomic History    Marital status: Single   Tobacco Use    Smoking status: Never    Smokeless tobacco: Never   Vaping Use    Vaping Use: Every day    Substances: Flavoring    Devices: Disposable   Substance and Sexual Activity    Alcohol use: Never    Sexual activity: Yes     Partners: Male        Allergies   Allergen Reactions    Latex Rash         Objective   Vital Signs:   /71 (BP Location: Right arm, Patient Position: Sitting, Cuff Size: Adult)   Pulse 75   Temp 98.4 øF (36.9 øC) (Temporal)   Ht 171.5 cm (67.5\")   Wt 87 kg (191 lb 12.8 oz)   SpO2 97%   BMI 29.60 kg/mý       Physical Exam  Vitals and nursing note reviewed. Exam conducted with a chaperone present (Sister Marta).   Constitutional:       General: She is not in acute distress.     Appearance: Normal appearance. She is not ill-appearing, toxic-appearing or diaphoretic.   HENT:      Head: Normocephalic and atraumatic.      Jaw: There is normal jaw occlusion.      Right Ear: Hearing, ear canal and external ear normal. No drainage. A middle ear effusion is present. Tympanic membrane is not erythematous or bulging.      Left Ear: Hearing, ear canal and external ear normal. No drainage. A middle ear effusion is present. Tympanic membrane is not erythematous or bulging.      Nose: Nose normal.      Mouth/Throat:      Lips: Pink.      Pharynx: Oropharynx is clear. Uvula midline.   Eyes:      General: Lids are normal. Vision grossly intact. Gaze aligned appropriately.      Extraocular Movements: Extraocular movements intact.      Conjunctiva/sclera: Conjunctivae normal.      Pupils: Pupils are equal, round, and reactive to light.   Cardiovascular:      Rate and Rhythm: Normal rate and " regular rhythm.      Pulses: Normal pulses.           Carotid pulses are 2+ on the right side and 2+ on the left side.       Radial pulses are 2+ on the right side and 2+ on the left side.        Dorsalis pedis pulses are 2+ on the right side and 2+ on the left side.        Posterior tibial pulses are 2+ on the right side and 2+ on the left side.      Heart sounds: Normal heart sounds, S1 normal and S2 normal. No murmur heard.  Pulmonary:      Effort: Pulmonary effort is normal.      Breath sounds: Normal breath sounds and air entry.   Abdominal:      General: Abdomen is flat. Bowel sounds are normal. There is no distension or abdominal bruit.      Palpations: Abdomen is soft.      Tenderness: There is abdominal tenderness in the right upper quadrant and right lower quadrant.   Musculoskeletal:         General: Normal range of motion.      Cervical back: Full passive range of motion without pain, normal range of motion and neck supple.      Right lower leg: No edema.      Left lower leg: No edema.   Skin:     General: Skin is warm and dry.      Capillary Refill: Capillary refill takes less than 2 seconds.      Coloration: Skin is not cyanotic or pale.      Findings: No bruising, erythema or rash.   Neurological:      General: No focal deficit present.      Mental Status: She is alert and oriented to person, place, and time. Mental status is at baseline.      GCS: GCS eye subscore is 4. GCS verbal subscore is 5. GCS motor subscore is 6.      Cranial Nerves: Cranial nerves 2-12 are intact. No cranial nerve deficit.      Sensory: Sensation is intact. No sensory deficit.      Motor: Motor function is intact. No weakness.      Coordination: Coordination is intact. Coordination normal.      Gait: Gait is intact. Gait normal.      Deep Tendon Reflexes: Reflexes normal.   Psychiatric:         Attention and Perception: Attention and perception normal.         Mood and Affect: Mood and affect normal.         Speech: Speech  normal.         Behavior: Behavior normal. Behavior is cooperative.         Thought Content: Thought content normal.         Cognition and Memory: Cognition and memory normal.         Judgment: Judgment normal.                 Assessment and Plan   Diagnoses and all orders for this visit:    1. Right sided abdominal pain (Primary)  Comments:  Reviewed previous CT scan with patient.  Pending abdominal ultrasound.  Toradol as needed.  FU with Urology.  Orders:  -     ketorolac (TORADOL) 10 MG tablet; Take 1 tablet by mouth Every 8 (Eight) Hours As Needed for Moderate Pain.  Dispense: 20 tablet; Refill: 0    2. Renal calculus, right  -     US Abdomen Complete; Future  -     Ambulatory Referral to Urology    3. Middle ear effusion, bilateral  Comments:  Treating with Zyrtec.  Orders:  -     cetirizine (zyrTEC) 10 MG tablet; Take 1 tablet by mouth Every Night.  Dispense: 30 tablet; Refill: 2        Follow Up   Return in about 6 weeks (around 11/21/2023) for Recheck.    There are no Patient Instructions on file for this visit.

## 2023-10-10 NOTE — TELEPHONE ENCOUNTER
"HUB transferred call    [facility].  Had for 3 weeks  Bleeding for 10 days straight.  Has tried to call [facility] Will not answer call or return voicemails. Has been trying to call x 2 weeks.    Saw PCP today for another issue and did not mention vaginal bleeding for 2 weeks and lightheadedness.    Triage completed. Care advice per guideline.  Reason for Disposition   SEVERE vaginal bleeding (e.g., soaking 2 pads or tampons per hour and present 2 or more hours; 1 menstrual cup every 2 hours)    Additional Information   Negative: Shock suspected (e.g., cold/pale/clammy skin, too weak to stand, low BP, rapid pulse)   Negative: Difficult to awaken or acting confused (e.g., disoriented, slurred speech)   Negative: Passed out (i.e., lost consciousness, collapsed and was not responding)   Negative: Sounds like a life-threatening emergency to the triager   Negative: Followed a genital area injury (e.g., vagina, vulva)   Negative: Pregnant 20 or more weeks   Negative: Pregnant < 20 weeks  (less than 5 months)   Negative: Postpartum (from 0 to 6 weeks after delivery)   Negative: Bleeding occurring > 12 months after menopause   Negative: Bleeding from sexual abuse or rape   Negative: [1] Vaginal discharge is main symptom AND [2] small amount of blood   Negative: SEVERE abdominal pain   Negative: SEVERE dizziness (e.g., unable to stand, requires support to walk, feels like passing out now)    Answer Assessment - Initial Assessment Questions  1. AMOUNT: \"Describe the bleeding that you are having.\"     - SPOTTING: spotting, or pinkish / brownish mucous discharge; does not fill panty liner or pad     - MILD:  less than 1 pad / hour; less than patient's usual menstrual bleeding    - MODERATE: 1-2 pads / hour; 1 menstrual cup every 6 hours; small-medium blood clots (e.g., pea, grape, small coin)    - SEVERE: soaking 2 or more pads/hour for 2 or more hours; 1 menstrual cup every 2 hours; bleeding not " "contained by pads or continuous red blood from vagina; large blood clots (e.g., golf ball, large coin)       Changing every 20 minutes or so  2. ONSET: \"When did the bleeding begin?\" \"Is it continuing now?\"      Bleeding like this for 10 days  3. MENSTRUAL PERIOD: \"When was the last normal menstrual period?\" \"How is this different than your period?\"      *No Answer*  4. REGULARITY: \"How regular are your periods?\"      *No Answer*  5. ABDOMEN PAIN: \"Do you have any pain?\" \"How bad is the pain?\"  (e.g., Scale 1-10; mild, moderate, or severe)    - MILD (1-3): doesn't interfere with normal activities, abdomen soft and not tender to touch     - MODERATE (4-7): interferes with normal activities or awakens from sleep, abdomen tender to touch     - SEVERE (8-10): excruciating pain, doubled over, unable to do any normal activities      Yes but also has kidney stones  6. PREGNANCY: \"Is there any chance you are pregnant?\" \"When was your last menstrual period?\"   no  7. BREASTFEEDING: \"Are you breastfeeding?\"      *No Answer*  8. HORMONE MEDICINES: \"Are you taking any hormone medicines, prescription or over-the-counter?\" (e.g., birth control pills, estrogen)  Nexcom  9. BLOOD THINNER MEDICINES: \"Do you take any blood thinners?\" (e.g., Coumadin / warfarin, Pradaxa / dabigatran, aspirin)      denies  10. CAUSE: \"What do you think is causing the bleeding?\" (e.g., recent gyn surgery, recent gyn procedure; known bleeding disorder, cervical cancer, polycystic ovarian disease, fibroids)          *No Answer*  11. HEMODYNAMIC STATUS: \"Are you weak or feeling lightheaded?\" If Yes, ask: \"Can you stand and walk normally?\"   Sometimes is lightheaded  12. OTHER SYMPTOMS: \"What other symptoms are you having with the bleeding?\" (e.g., passed tissue, vaginal discharge, fever, menstrual-type cramps)     Bleeding with clots sometimes passes clots, yesterday passed a large clot.    Protocols used: Vaginal Bleeding - Abnormal-ADULT-AH    "

## 2023-10-10 NOTE — TELEPHONE ENCOUNTER
Rosario with [facility] called in regards to the pt's Toradol tablets. Rosario states the script only exceeds 5 days. Rosario was instructed to change the script, from 8 days to 5 days. EMERALD

## 2023-10-19 ENCOUNTER — TELEPHONE (OUTPATIENT)
Dept: FAMILY MEDICINE CLINIC | Facility: CLINIC | Age: 28
End: 2023-10-19
Payer: MEDICAID

## 2023-10-19 ENCOUNTER — TELEPHONE (OUTPATIENT)
Dept: FAMILY MEDICINE CLINIC | Facility: CLINIC | Age: 28
End: 2023-10-19

## 2023-10-19 DIAGNOSIS — F90.2 ADHD (ATTENTION DEFICIT HYPERACTIVITY DISORDER), COMBINED TYPE: Primary | ICD-10-CM

## 2023-10-19 DIAGNOSIS — F41.0 GENERALIZED ANXIETY DISORDER WITH PANIC ATTACKS: ICD-10-CM

## 2023-10-19 DIAGNOSIS — F41.1 GENERALIZED ANXIETY DISORDER WITH PANIC ATTACKS: ICD-10-CM

## 2023-10-19 DIAGNOSIS — F31.9 BIPOLAR DEPRESSION: ICD-10-CM

## 2023-10-19 DIAGNOSIS — N20.0 RENAL CALCULUS, RIGHT: ICD-10-CM

## 2023-10-19 NOTE — TELEPHONE ENCOUNTER
Caller: Deanna Bah    Relationship to patient: Self    Best call back number: [phone]    Patient is needing:     PATIENT SAID SHE IS AWARE HER KIDNEY STONE PASSED BUT IS WANTING TO KNOW IF HER TESTS REVEALED ANY ISSUES WITH HER APPENDIX OR GALLBLADDER     REQUESTED CALLBACK

## 2023-10-20 RX ORDER — LAMOTRIGINE 25 MG/1
25 TABLET ORAL DAILY
Qty: 30 TABLET | Refills: 0 | OUTPATIENT
Start: 2023-10-20

## 2023-10-23 NOTE — TELEPHONE ENCOUNTER
HUB IS INSTRUCTED TO READ BELOW MESSAGE       If pt calls back message from [facility]:   She was referred to [facility] on 02/06/2023. I just placed a new referral to [facility] for her to get in with for this. Please advise her that they should be calling her to setup an appt.

## 2023-10-24 ENCOUNTER — OFFICE VISIT (OUTPATIENT)
Dept: FAMILY MEDICINE CLINIC | Facility: CLINIC | Age: 28
End: 2023-10-24
Payer: MEDICAID

## 2023-10-24 VITALS
DIASTOLIC BLOOD PRESSURE: 79 MMHG | OXYGEN SATURATION: 98 % | SYSTOLIC BLOOD PRESSURE: 111 MMHG | HEIGHT: 68 IN | BODY MASS INDEX: 29.01 KG/M2 | WEIGHT: 191.4 LBS | HEART RATE: 84 BPM | TEMPERATURE: 97.7 F

## 2023-10-24 DIAGNOSIS — J45.20 MILD INTERMITTENT ASTHMA WITHOUT COMPLICATION: ICD-10-CM

## 2023-10-24 DIAGNOSIS — E27.8 ADRENAL MASS, LEFT: ICD-10-CM

## 2023-10-24 DIAGNOSIS — Z00.00 WELL WOMAN EXAM (NO GYNECOLOGICAL EXAM): Primary | ICD-10-CM

## 2023-10-24 DIAGNOSIS — F41.0 PANIC ATTACKS: ICD-10-CM

## 2023-10-24 DIAGNOSIS — E55.9 VITAMIN D DEFICIENCY: ICD-10-CM

## 2023-10-24 DIAGNOSIS — F41.1 GAD (GENERALIZED ANXIETY DISORDER): ICD-10-CM

## 2023-10-24 DIAGNOSIS — D50.8 IRON DEFICIENCY ANEMIA SECONDARY TO INADEQUATE DIETARY IRON INTAKE: ICD-10-CM

## 2023-10-24 DIAGNOSIS — Z13.31 SCREENING FOR DEPRESSION: ICD-10-CM

## 2023-10-24 DIAGNOSIS — Z76.0 MEDICATION REFILL: ICD-10-CM

## 2023-10-24 RX ORDER — MONTELUKAST SODIUM 10 MG/1
10 TABLET ORAL NIGHTLY
Qty: 90 TABLET | Refills: 0 | Status: SHIPPED | OUTPATIENT
Start: 2023-10-24

## 2023-10-24 RX ORDER — FLUTICASONE PROPIONATE 50 MCG
2 SPRAY, SUSPENSION (ML) NASAL DAILY
Qty: 18.2 ML | Refills: 0 | Status: SHIPPED | OUTPATIENT
Start: 2023-10-24

## 2023-10-24 RX ORDER — ALPRAZOLAM 0.5 MG/1
0.5 TABLET ORAL EVERY 12 HOURS
Qty: 60 TABLET | Refills: 0 | Status: SHIPPED | OUTPATIENT
Start: 2023-10-24

## 2023-10-24 RX ORDER — HYDROXYZINE 50 MG/1
50 TABLET, FILM COATED ORAL 2 TIMES DAILY PRN
Qty: 60 TABLET | Refills: 0 | Status: SHIPPED | OUTPATIENT
Start: 2023-10-24

## 2023-10-24 RX ORDER — CETIRIZINE HYDROCHLORIDE 10 MG/1
10 TABLET ORAL NIGHTLY
Qty: 90 TABLET | Refills: 2 | Status: SHIPPED | OUTPATIENT
Start: 2023-10-24

## 2023-10-24 RX ORDER — LANOLIN ALCOHOL/MO/W.PET/CERES
1 CREAM (GRAM) TOPICAL
Qty: 90 TABLET | Refills: 0 | Status: SHIPPED | OUTPATIENT
Start: 2023-10-24

## 2023-10-24 RX ORDER — FOLIC ACID 1 MG/1
1000 TABLET ORAL DAILY
Qty: 90 TABLET | Refills: 0 | Status: SHIPPED | OUTPATIENT
Start: 2023-10-24

## 2023-10-24 NOTE — PROGRESS NOTES
Deanna Bah  [id number]  1995  female     10/24/2023      Chief Complaint  Annual Exam, Med Refill (All medications), and Labs Only (Discuss most recent labs)    History of Present Illness  28-year-old female patient presents today for her annual exam.  Patient states she needs refills on all of her medications.  Patient was to review her most recent set of labs and abdominal ultrasound results.  Reviewed labs from September, July, June which were stable.  Patient's abdominal ultrasound from 10/10 was unremarkable and showed no evidence of kidney stone.  Patient states she just had her Pap exam done by her gynecologist roughly 3-4 weeks ago and states it was unremarkable.  Patient denies any complaints or concerns today.  Patient states occasionally she will have anxiety and states she feels she could benefit from having something else to take for her anxiety.  Patient states she had abnormal thoughts when taking BuSpar therefore stopped taking it.  Patient screened for depression today, denies feeling down.  Patient is up-to-date on her influenza and tetanus vaccines.  Patient had an incidental finding of a left adrenal mass that showed 1.8 cm on abdominal CT scan of September 19, 2023.  Patient agrees on obtaining MRI to further evaluate adrenal mass.  Denies fever, chills, body aches, abdominal pain at this time, NVD, dysuria.      Review of Systems   Constitutional: Negative.    HENT: Negative.     Eyes: Negative.    Respiratory: Negative.     Cardiovascular: Negative.    Gastrointestinal: Negative.    Endocrine: Negative.    Genitourinary: Negative.    Musculoskeletal: Negative.    Skin: Negative.    Neurological: Negative.    Hematological: Negative.    Psychiatric/Behavioral: Negative.         Past Medical History:   Diagnosis Date    Anxiety     Asthma     Bipolar 1 disorder     Hypertension     Lordoscoliosis        Past Surgical History:   Procedure Laterality Date    KIDNEY STONE SURGERY       "MOUTH SURGERY         Family History   Problem Relation Age of Onset    Hypertension Mother     Diabetes Mother     Liver cancer Father     Diabetes Sister     Liver cancer Maternal Grandmother     Aneurysm Maternal Grandfather     Breast cancer Paternal Grandmother        Social History     Socioeconomic History    Marital status: Single   Tobacco Use    Smoking status: Never    Smokeless tobacco: Never   Vaping Use    Vaping Use: Every day    Substances: Flavoring    Devices: Disposable   Substance and Sexual Activity    Alcohol use: Never    Sexual activity: Yes     Partners: Male        Allergies   Allergen Reactions    Latex Rash         Objective   Vital Signs:   /79 (BP Location: Right arm, Patient Position: Sitting, Cuff Size: Large Adult)   Pulse 84   Temp 97.7 °F (36.5 °C) (Temporal)   Ht 171.5 cm (67.5\")   Wt 86.8 kg (191 lb 6.4 oz)   SpO2 98%   BMI 29.54 kg/m²       Physical Exam  Vitals and nursing note reviewed.   Constitutional:       General: She is not in acute distress.     Appearance: Normal appearance. She is not ill-appearing, toxic-appearing or diaphoretic.   HENT:      Head: Normocephalic and atraumatic.      Jaw: There is normal jaw occlusion.      Right Ear: Hearing and external ear normal.      Left Ear: Hearing and external ear normal.      Nose: Nose normal.      Mouth/Throat:      Lips: Pink.   Eyes:      General: Lids are normal. Vision grossly intact. Gaze aligned appropriately.      Extraocular Movements: Extraocular movements intact.      Conjunctiva/sclera: Conjunctivae normal.      Pupils: Pupils are equal, round, and reactive to light.   Cardiovascular:      Rate and Rhythm: Normal rate and regular rhythm.      Pulses: Normal pulses.           Carotid pulses are 2+ on the right side and 2+ on the left side.       Radial pulses are 2+ on the right side and 2+ on the left side.        Dorsalis pedis pulses are 2+ on the right side and 2+ on the left side.        " Posterior tibial pulses are 2+ on the right side and 2+ on the left side.      Heart sounds: Normal heart sounds, S1 normal and S2 normal. No murmur heard.  Pulmonary:      Effort: Pulmonary effort is normal.      Breath sounds: Normal breath sounds and air entry.   Abdominal:      General: Abdomen is flat. Bowel sounds are normal. There is no distension or abdominal bruit.      Palpations: Abdomen is soft.      Tenderness: There is no abdominal tenderness.   Musculoskeletal:         General: Normal range of motion.      Cervical back: Full passive range of motion without pain, normal range of motion and neck supple.      Right lower leg: No edema.      Left lower leg: No edema.   Skin:     General: Skin is warm and dry.      Capillary Refill: Capillary refill takes less than 2 seconds.      Coloration: Skin is not cyanotic or pale.      Findings: No bruising, erythema or rash.   Neurological:      General: No focal deficit present.      Mental Status: She is alert and oriented to person, place, and time. Mental status is at baseline.      GCS: GCS eye subscore is 4. GCS verbal subscore is 5. GCS motor subscore is 6.      Cranial Nerves: Cranial nerves 2-12 are intact. No cranial nerve deficit.      Sensory: Sensation is intact. No sensory deficit.      Motor: Motor function is intact. No weakness.      Coordination: Coordination is intact. Coordination normal.      Gait: Gait is intact. Gait normal.      Deep Tendon Reflexes: Reflexes normal.   Psychiatric:         Attention and Perception: Attention and perception normal.         Mood and Affect: Mood and affect normal.         Speech: Speech normal.         Behavior: Behavior normal. Behavior is cooperative.         Thought Content: Thought content normal.         Cognition and Memory: Cognition and memory normal.         Judgment: Judgment normal.                 Assessment and Plan   Diagnoses and all orders for this visit:    1. Well woman exam (no  gynecological exam) (Primary)    2. Adrenal mass, left  Comments:  Pending MRI abdomen with contrast.  Orders:  -     MRI Abdomen With Contrast; Future    3. Medication refill  -     vitamin D3 (Vitamin D) 125 MCG (5000 UT) capsule capsule; Take 1 capsule by mouth Daily.  Dispense: 30 capsule; Refill: 1  -     montelukast (SINGULAIR) 10 MG tablet; Take 1 tablet by mouth Every Night.  Dispense: 90 tablet; Refill: 0  -     folic acid (FOLVITE) 1 MG tablet; Take 1 tablet by mouth Daily.  Dispense: 90 tablet; Refill: 0  -     fluticasone (FLONASE) 50 MCG/ACT nasal spray; 2 sprays into the nostril(s) as directed by provider Daily.  Dispense: 18.2 mL; Refill: 0  -     ferrous sulfate 325 (65 FE) MG EC tablet; Take 1 tablet by mouth Daily With Breakfast.  Dispense: 90 tablet; Refill: 0  -     cetirizine (zyrTEC) 10 MG tablet; Take 1 tablet by mouth Every Night.  Dispense: 90 tablet; Refill: 2  -     ALPRAZolam (XANAX) 0.5 MG tablet; Take 1 tablet by mouth Every 12 (Twelve) Hours.  Dispense: 60 tablet; Refill: 0    4. Vitamin D deficiency  -     vitamin D3 (Vitamin D) 125 MCG (5000 UT) capsule capsule; Take 1 capsule by mouth Daily.  Dispense: 30 capsule; Refill: 1    5. Mild intermittent asthma without complication  Comments:  Stable.  Refilled Singulair and albuterol inhaler PRN.  Orders:  -     montelukast (SINGULAIR) 10 MG tablet; Take 1 tablet by mouth Every Night.  Dispense: 90 tablet; Refill: 0  -     fluticasone (FLONASE) 50 MCG/ACT nasal spray; 2 sprays into the nostril(s) as directed by provider Daily.  Dispense: 18.2 mL; Refill: 0  -     cetirizine (zyrTEC) 10 MG tablet; Take 1 tablet by mouth Every Night.  Dispense: 90 tablet; Refill: 2    6. Iron deficiency anemia secondary to inadequate dietary iron intake  Comments:  Patient on ferrous sulfate.  Refilled ferrous sulfate today.  Orders:  -     folic acid (FOLVITE) 1 MG tablet; Take 1 tablet by mouth Daily.  Dispense: 90 tablet; Refill: 0  -     ferrous sulfate  325 (65 FE) MG EC tablet; Take 1 tablet by mouth Daily With Breakfast.  Dispense: 90 tablet; Refill: 0    7. Panic attacks  Assessment & Plan:  Continue current dose of Xanax. Adding hydroxyzine. FU in 3 months for recheck.    Orders:  -     ALPRAZolam (XANAX) 0.5 MG tablet; Take 1 tablet by mouth Every 12 (Twelve) Hours.  Dispense: 60 tablet; Refill: 0  -     hydrOXYzine (ATARAX) 50 MG tablet; Take 1 tablet by mouth 2 (Two) Times a Day As Needed for Anxiety.  Dispense: 60 tablet; Refill: 0    8. OJNNA (generalized anxiety disorder)  Assessment & Plan:  Continue current dose of Xanax. Adding hydroxyzine. FU in 3 months for recheck.    Orders:  -     ALPRAZolam (XANAX) 0.5 MG tablet; Take 1 tablet by mouth Every 12 (Twelve) Hours.  Dispense: 60 tablet; Refill: 0  -     hydrOXYzine (ATARAX) 50 MG tablet; Take 1 tablet by mouth 2 (Two) Times a Day As Needed for Anxiety.  Dispense: 60 tablet; Refill: 0    9. Screening for depression        Follow Up   Return in about 3 months (around 1/24/2024) for Recheck.    There are no Patient Instructions on file for this visit.

## 2023-10-30 ENCOUNTER — TELEPHONE (OUTPATIENT)
Dept: FAMILY MEDICINE CLINIC | Facility: CLINIC | Age: 28
End: 2023-10-30
Payer: MEDICAID

## 2023-10-30 NOTE — TELEPHONE ENCOUNTER
Pt called stating she can't take the Atarax 50mg, due to they increase her suicidal thoughts. The pt has an appt with her provider on 11/6/23.

## 2023-11-06 ENCOUNTER — OFFICE VISIT (OUTPATIENT)
Dept: FAMILY MEDICINE CLINIC | Facility: CLINIC | Age: 28
End: 2023-11-06
Payer: MEDICAID

## 2023-11-06 VITALS
OXYGEN SATURATION: 99 % | DIASTOLIC BLOOD PRESSURE: 80 MMHG | HEIGHT: 68 IN | HEART RATE: 82 BPM | TEMPERATURE: 98.5 F | SYSTOLIC BLOOD PRESSURE: 117 MMHG | BODY MASS INDEX: 28.76 KG/M2 | WEIGHT: 189.8 LBS

## 2023-11-06 DIAGNOSIS — Z76.0 MEDICATION REFILL: ICD-10-CM

## 2023-11-06 DIAGNOSIS — H65.93 BILATERAL OTITIS MEDIA WITH EFFUSION: ICD-10-CM

## 2023-11-06 DIAGNOSIS — F41.8 DEPRESSION WITH ANXIETY: Primary | ICD-10-CM

## 2023-11-06 DIAGNOSIS — Z13.31 SCREENING FOR DEPRESSION: ICD-10-CM

## 2023-11-06 DIAGNOSIS — F31.9 BIPOLAR DEPRESSION: ICD-10-CM

## 2023-11-06 PROCEDURE — 96127 BRIEF EMOTIONAL/BEHAV ASSMT: CPT | Performed by: NURSE PRACTITIONER

## 2023-11-06 PROCEDURE — 1160F RVW MEDS BY RX/DR IN RCRD: CPT | Performed by: NURSE PRACTITIONER

## 2023-11-06 PROCEDURE — T1015 CLINIC SERVICE: HCPCS | Performed by: NURSE PRACTITIONER

## 2023-11-06 PROCEDURE — 1159F MED LIST DOCD IN RCRD: CPT | Performed by: NURSE PRACTITIONER

## 2023-11-06 PROCEDURE — 99214 OFFICE O/P EST MOD 30 MIN: CPT | Performed by: NURSE PRACTITIONER

## 2023-11-06 RX ORDER — LAMOTRIGINE 25 MG/1
25 TABLET ORAL DAILY
Qty: 30 TABLET | Refills: 0 | Status: SHIPPED | OUTPATIENT
Start: 2023-11-06

## 2023-11-06 RX ORDER — SERTRALINE HYDROCHLORIDE 25 MG/1
25 TABLET, FILM COATED ORAL DAILY
Qty: 30 TABLET | Refills: 2 | Status: SHIPPED | OUTPATIENT
Start: 2023-11-06

## 2023-11-06 RX ORDER — AMOXICILLIN 875 MG/1
875 TABLET, COATED ORAL 2 TIMES DAILY
Qty: 20 TABLET | Refills: 0 | Status: SHIPPED | OUTPATIENT
Start: 2023-11-06 | End: 2023-11-16

## 2023-11-13 NOTE — ASSESSMENT & PLAN NOTE
Starting Zoloft 25mg daily. Continue 0.5mg BID Xanax PRN. Advised patient to follow up with behavioral health. FU with me in 1 month for recheck.

## 2023-11-13 NOTE — PROGRESS NOTES
"Deanna Bah  [id number]  1995  female     11/06/2023      Chief Complaint  Medication Problem (States her hydroxyzine makes her \"suicidal\" and wants to discuss other options)    History of Present Illness  28 year old female patient presents today to discuss medication problem. Patient states hydroxyzine makes her have suicidal thoughts so she stopped taking it. Patient states she would like to discuss other medication options for her anxiety and depression. Patient screened for depression today, see questionnaire. Patient denies suicidal thoughts today, denies intent of harming self or others, denies suicidal plan. Patient states she needs a refill on her Lamictal. Patient states she has not established an appointment with behavioral health yet. Patient complains of ear pain. Denies fever, chills, body aches, headache, neck pain, lightheadedness, dizziness, sore throat, cough, chest pain, abdominal pain, NVD, dysuria, rash, or any other symptoms.       Review of Systems   Constitutional: Negative.    HENT: Negative.     Eyes: Negative.    Respiratory: Negative.     Cardiovascular: Negative.    Gastrointestinal: Negative.    Endocrine: Negative.    Genitourinary: Negative.    Musculoskeletal: Negative.    Skin: Negative.    Neurological: Negative.    Hematological: Negative.    Psychiatric/Behavioral: Negative.         Past Medical History:   Diagnosis Date    Anxiety     Asthma     Bipolar 1 disorder     Hypertension     Lordoscoliosis        Past Surgical History:   Procedure Laterality Date    KIDNEY STONE SURGERY      MOUTH SURGERY         Family History   Problem Relation Age of Onset    Hypertension Mother     Diabetes Mother     Liver cancer Father     Diabetes Sister     Liver cancer Maternal Grandmother     Aneurysm Maternal Grandfather     Breast cancer Paternal Grandmother        Social History     Socioeconomic History    Marital status: Single   Tobacco Use    Smoking status: Never    " "Smokeless tobacco: Never   Vaping Use    Vaping Use: Every day    Substances: Flavoring    Devices: Disposable   Substance and Sexual Activity    Alcohol use: Never    Sexual activity: Yes     Partners: Male        Allergies   Allergen Reactions    Latex Rash         Objective   Vital Signs:   /80 (BP Location: Right arm, Patient Position: Sitting, Cuff Size: Large Adult)   Pulse 82   Temp 98.5 °F (36.9 °C) (Temporal)   Ht 171.5 cm (67.5\")   Wt 86.1 kg (189 lb 12.8 oz)   SpO2 99%   BMI 29.29 kg/m²       Physical Exam  Vitals and nursing note reviewed.   Constitutional:       General: She is not in acute distress.     Appearance: Normal appearance. She is not ill-appearing, toxic-appearing or diaphoretic.   HENT:      Head: Normocephalic and atraumatic.      Jaw: There is normal jaw occlusion.      Right Ear: Hearing and external ear normal.      Left Ear: Hearing and external ear normal.      Nose: Nose normal.      Mouth/Throat:      Lips: Pink.   Eyes:      General: Lids are normal. Vision grossly intact. Gaze aligned appropriately.      Extraocular Movements: Extraocular movements intact.      Conjunctiva/sclera: Conjunctivae normal.      Pupils: Pupils are equal, round, and reactive to light.   Cardiovascular:      Rate and Rhythm: Normal rate and regular rhythm.      Pulses: Normal pulses.           Carotid pulses are 2+ on the right side and 2+ on the left side.       Radial pulses are 2+ on the right side and 2+ on the left side.        Dorsalis pedis pulses are 2+ on the right side and 2+ on the left side.        Posterior tibial pulses are 2+ on the right side and 2+ on the left side.      Heart sounds: Normal heart sounds, S1 normal and S2 normal. No murmur heard.  Pulmonary:      Effort: Pulmonary effort is normal.      Breath sounds: Normal breath sounds and air entry.   Abdominal:      General: Abdomen is flat. Bowel sounds are normal. There is no distension or abdominal bruit.      " Palpations: Abdomen is soft.      Tenderness: There is no abdominal tenderness.   Musculoskeletal:         General: Normal range of motion.      Cervical back: Full passive range of motion without pain, normal range of motion and neck supple.      Right lower leg: No edema.      Left lower leg: No edema.   Skin:     General: Skin is warm and dry.      Capillary Refill: Capillary refill takes less than 2 seconds.      Coloration: Skin is not cyanotic or pale.      Findings: No bruising, erythema or rash.   Neurological:      General: No focal deficit present.      Mental Status: She is alert and oriented to person, place, and time. Mental status is at baseline.      GCS: GCS eye subscore is 4. GCS verbal subscore is 5. GCS motor subscore is 6.      Cranial Nerves: Cranial nerves 2-12 are intact. No cranial nerve deficit.      Sensory: Sensation is intact. No sensory deficit.      Motor: Motor function is intact. No weakness.      Coordination: Coordination is intact. Coordination normal.      Gait: Gait is intact. Gait normal.      Deep Tendon Reflexes: Reflexes normal.   Psychiatric:         Attention and Perception: Attention and perception normal.         Mood and Affect: Mood is anxious (mild) and depressed (mild). Mood is not elated. Affect is not labile, blunt, flat, angry, tearful or inappropriate.         Speech: Speech normal.         Behavior: Behavior normal. Behavior is cooperative.         Thought Content: Thought content normal.         Cognition and Memory: Cognition and memory normal.         Judgment: Judgment normal.                 Assessment and Plan   Diagnoses and all orders for this visit:    1. Depression with anxiety (Primary)  Assessment & Plan:  Starting Zoloft 25mg daily. Continue 0.5mg BID Xanax PRN. Advised patient to follow up with behavioral health. FU with me in 1 month for recheck.    Orders:  -     sertraline (Zoloft) 25 MG tablet; Take 1 tablet by mouth Daily.  Dispense: 30 tablet;  Refill: 2    2. Bipolar depression  Comments:  Refilled Lamictal. Advised patient to follow up with behavioral health.  Orders:  -     lamoTRIgine (LaMICtal) 25 MG tablet; Take 1 tablet by mouth Daily.  Dispense: 30 tablet; Refill: 0    3. Medication refill  -     lamoTRIgine (LaMICtal) 25 MG tablet; Take 1 tablet by mouth Daily.  Dispense: 30 tablet; Refill: 0    4. Bilateral otitis media with effusion  Comments:  Treating with Amoxicillin.  Orders:  -     amoxicillin (AMOXIL) 875 MG tablet; Take 1 tablet by mouth 2 (Two) Times a Day for 10 days.  Dispense: 20 tablet; Refill: 0    5. Screening for depression        Follow Up   Return in about 2 months (around 1/6/2024) for Recheck.    There are no Patient Instructions on file for this visit.

## 2023-11-15 ENCOUNTER — TELEPHONE (OUTPATIENT)
Dept: FAMILY MEDICINE CLINIC | Facility: CLINIC | Age: 28
End: 2023-11-15
Payer: MEDICAID

## 2023-12-05 DIAGNOSIS — F41.0 PANIC ATTACKS: ICD-10-CM

## 2023-12-05 DIAGNOSIS — F31.9 BIPOLAR DEPRESSION: ICD-10-CM

## 2023-12-05 DIAGNOSIS — F41.1 GAD (GENERALIZED ANXIETY DISORDER): ICD-10-CM

## 2023-12-05 DIAGNOSIS — Z76.0 MEDICATION REFILL: ICD-10-CM

## 2023-12-06 DIAGNOSIS — F31.9 BIPOLAR DEPRESSION: ICD-10-CM

## 2023-12-06 DIAGNOSIS — F41.1 GAD (GENERALIZED ANXIETY DISORDER): ICD-10-CM

## 2023-12-06 DIAGNOSIS — F41.0 PANIC ATTACKS: ICD-10-CM

## 2023-12-06 DIAGNOSIS — Z76.0 MEDICATION REFILL: ICD-10-CM

## 2023-12-06 RX ORDER — LAMOTRIGINE 25 MG/1
25 TABLET ORAL DAILY
Qty: 30 TABLET | Refills: 0 | OUTPATIENT
Start: 2023-12-06

## 2023-12-06 RX ORDER — ALPRAZOLAM 0.5 MG/1
0.5 TABLET ORAL EVERY 12 HOURS
Qty: 60 TABLET | Refills: 0 | OUTPATIENT
Start: 2023-12-06

## 2023-12-06 NOTE — TELEPHONE ENCOUNTER
Rx Refill Note  Requested Prescriptions     Pending Prescriptions Disp Refills    ALPRAZolam (XANAX) 0.5 MG tablet 60 tablet 0     Sig: Take 1 tablet by mouth Every 12 (Twelve) Hours.    lamoTRIgine (LaMICtal) 25 MG tablet 30 tablet 0     Sig: Take 1 tablet by mouth Daily.      Last office visit with prescribing clinician: 11/6/2023   Last telemedicine visit with prescribing clinician: Visit date not found   Next office visit with prescribing clinician: 12/7/2023                         Would you like a call back once the refill request has been completed: [] Yes [] No    If the office needs to give you a call back, can they leave a voicemail: [] Yes [] No    Mariana Wilson MA  12/06/23, 08:29 EST    INSPECT - KELLEY TEIXEIRA / INSPECT (12/06/2023)

## 2023-12-07 RX ORDER — ALPRAZOLAM 0.5 MG/1
0.5 TABLET ORAL EVERY 12 HOURS
Qty: 60 TABLET | Refills: 0 | Status: SHIPPED | OUTPATIENT
Start: 2023-12-07

## 2023-12-07 RX ORDER — LAMOTRIGINE 25 MG/1
25 TABLET ORAL DAILY
Qty: 30 TABLET | Refills: 0 | Status: SHIPPED | OUTPATIENT
Start: 2023-12-07

## 2023-12-19 DIAGNOSIS — E55.9 VITAMIN D DEFICIENCY: ICD-10-CM

## 2023-12-19 DIAGNOSIS — Z76.0 MEDICATION REFILL: ICD-10-CM

## 2023-12-19 DIAGNOSIS — J45.20 MILD INTERMITTENT ASTHMA WITHOUT COMPLICATION: ICD-10-CM

## 2023-12-19 DIAGNOSIS — D50.8 IRON DEFICIENCY ANEMIA SECONDARY TO INADEQUATE DIETARY IRON INTAKE: ICD-10-CM

## 2023-12-19 RX ORDER — FLUTICASONE PROPIONATE 50 MCG
2 SPRAY, SUSPENSION (ML) NASAL DAILY
Qty: 18.2 ML | Refills: 0 | Status: SHIPPED | OUTPATIENT
Start: 2023-12-19

## 2023-12-19 RX ORDER — ALBUTEROL SULFATE 90 UG/1
2 AEROSOL, METERED RESPIRATORY (INHALATION) EVERY 4 HOURS PRN
Qty: 18 G | Refills: 2 | Status: SHIPPED | OUTPATIENT
Start: 2023-12-19

## 2023-12-19 RX ORDER — CYCLOBENZAPRINE HCL 10 MG
10 TABLET ORAL 3 TIMES DAILY
Qty: 30 TABLET | Refills: 0 | OUTPATIENT
Start: 2023-12-19

## 2023-12-19 NOTE — TELEPHONE ENCOUNTER
Caller:       Deanna Bah (Self) [phone] (Mobile)         Requested Prescriptions:   Requested Prescriptions     Pending Prescriptions Disp Refills    vitamin D3 (Vitamin D) 125 MCG (5000 UT) capsule capsule 30 capsule 1     Sig: Take 1 capsule by mouth Daily.    fluticasone (FLONASE) 50 MCG/ACT nasal spray 18.2 mL 0     Si sprays into the nostril(s) as directed by provider Daily.    cyclobenzaprine (FLEXERIL) 10 MG tablet 30 tablet 0     Sig: Take 1 tablet by mouth 3 (Three) Times a Day.    albuterol sulfate  (90 Base) MCG/ACT inhaler 18 g 2     Sig: Inhale 2 puffs Every 4 (Four) Hours As Needed for Wheezing.        [address] [place] [address]  [phone] [facility] [phone]  [phone]       Last office visit with prescribing clinician: 2023   Last telemedicine visit with prescribing clinician: Visit date not found   Next office visit with prescribing clinician: 2024     Additional details provided by patient:     Does the patient have less than a 3 day supply:  [x] Yes  [] No    Would you like a call back once the refill request has been completed: [] Yes [] No    If the office needs to give you a call back, can they leave a voicemail: [] Yes [] No    Oliver Pena Rep   23 11:49 EST

## 2023-12-23 ENCOUNTER — HOSPITAL ENCOUNTER (EMERGENCY)
Facility: HOSPITAL | Age: 28
Discharge: HOME | End: 2023-12-23
Attending: EMERGENCY MEDICINE
Payer: MEDICAID

## 2023-12-23 VITALS
TEMPERATURE: 98.5 F | HEART RATE: 76 BPM | BODY MASS INDEX: 30.45 KG/M2 | RESPIRATION RATE: 16 BRPM | SYSTOLIC BLOOD PRESSURE: 127 MMHG | WEIGHT: 194 LBS | DIASTOLIC BLOOD PRESSURE: 91 MMHG | HEIGHT: 67 IN | OXYGEN SATURATION: 100 %

## 2023-12-23 DIAGNOSIS — H66.002 NON-RECURRENT ACUTE SUPPURATIVE OTITIS MEDIA OF LEFT EAR WITHOUT SPONTANEOUS RUPTURE OF TYMPANIC MEMBRANE: ICD-10-CM

## 2023-12-23 DIAGNOSIS — M54.50 ACUTE LEFT-SIDED LOW BACK PAIN WITHOUT SCIATICA: Primary | ICD-10-CM

## 2023-12-23 PROCEDURE — 25010000002 KETOROLAC TROMETHAMINE PER 15 MG: Performed by: EMERGENCY MEDICINE

## 2023-12-23 PROCEDURE — 96372 THER/PROPH/DIAG INJ SC/IM: CPT

## 2023-12-23 PROCEDURE — 99282 EMERGENCY DEPT VISIT SF MDM: CPT

## 2023-12-23 RX ORDER — AMOXICILLIN 875 MG/1
875 TABLET, COATED ORAL 3 TIMES DAILY
Qty: 21 TABLET | Refills: 0 | Status: SHIPPED | OUTPATIENT
Start: 2023-12-23

## 2023-12-23 RX ORDER — CYCLOBENZAPRINE HCL 10 MG
10 TABLET ORAL 3 TIMES DAILY PRN
Qty: 9 TABLET | Refills: 0 | Status: SHIPPED | OUTPATIENT
Start: 2023-12-23 | End: 2023-12-23 | Stop reason: SDUPTHER

## 2023-12-23 RX ORDER — AMOXICILLIN 875 MG/1
875 TABLET, COATED ORAL 3 TIMES DAILY
Qty: 21 TABLET | Refills: 0 | Status: SHIPPED | OUTPATIENT
Start: 2023-12-23 | End: 2023-12-23 | Stop reason: SDUPTHER

## 2023-12-23 RX ORDER — CYCLOBENZAPRINE HCL 10 MG
10 TABLET ORAL 3 TIMES DAILY PRN
Qty: 9 TABLET | Refills: 0 | Status: SHIPPED | OUTPATIENT
Start: 2023-12-23

## 2023-12-23 RX ORDER — MELOXICAM 15 MG/1
15 TABLET ORAL DAILY
Qty: 10 TABLET | Refills: 0 | Status: SHIPPED | OUTPATIENT
Start: 2023-12-23 | End: 2023-12-23 | Stop reason: SDUPTHER

## 2023-12-23 RX ORDER — KETOROLAC TROMETHAMINE 30 MG/ML
30 INJECTION, SOLUTION INTRAMUSCULAR; INTRAVENOUS ONCE
Status: COMPLETED | OUTPATIENT
Start: 2023-12-23 | End: 2023-12-23

## 2023-12-23 RX ORDER — MELOXICAM 15 MG/1
15 TABLET ORAL DAILY
Qty: 10 TABLET | Refills: 0 | Status: SHIPPED | OUTPATIENT
Start: 2023-12-23

## 2023-12-23 RX ADMIN — KETOROLAC TROMETHAMINE 30 MG: 30 INJECTION, SOLUTION INTRAMUSCULAR at 22:02

## 2023-12-24 NOTE — DISCHARGE INSTRUCTIONS
May use heat to sore area.  May use Salonpas pain patch to painful area.  Follow-up with primary provider for recheck, return new or worsening symptoms.  May also use Tylenol for pain

## 2023-12-24 NOTE — ED PROVIDER NOTES
Subjective   History of Present Illness  28-year-old female presents with low back pain.  She states it started last evening.  It is worse with movement.  She states she has had some headache off and on since having the flu 2 weeks ago.  She has had a slight cough.  She has had no bowel or bladder complaints other than some diarrhea which she reports she has had about 5 times a day.  She reports last menstrual period was earlier this month normal time.  Review of Systems    Past Medical History:   Diagnosis Date    Anxiety     Asthma     Bipolar 1 disorder     Hypertension     Lordoscoliosis        Allergies   Allergen Reactions    Latex Rash       Past Surgical History:   Procedure Laterality Date    KIDNEY STONE SURGERY      MOUTH SURGERY         Family History   Problem Relation Age of Onset    Hypertension Mother     Diabetes Mother     Liver cancer Father     Diabetes Sister     Liver cancer Maternal Grandmother     Aneurysm Maternal Grandfather     Breast cancer Paternal Grandmother        Social History     Socioeconomic History    Marital status: Single   Tobacco Use    Smoking status: Never    Smokeless tobacco: Never   Vaping Use    Vaping Use: Every day    Substances: Flavoring    Devices: Disposable   Substance and Sexual Activity    Alcohol use: Never    Sexual activity: Yes     Partners: Male     Prior to Admission medications    Medication Sig Start Date End Date Taking? Authorizing Provider   albuterol sulfate  (90 Base) MCG/ACT inhaler Inhale 2 puffs Every 4 (Four) Hours As Needed for Wheezing. 12/19/23   Favian Dueñas APRN   ALPRAZolam (XANAX) 0.5 MG tablet Take 1 tablet by mouth Every 12 (Twelve) Hours. 12/7/23   Favian Dueñas APRN   cetirizine (zyrTEC) 10 MG tablet Take 1 tablet by mouth Every Night. 10/24/23   Favian Dueñas APRN   cyclobenzaprine (FLEXERIL) 10 MG tablet Take 1 tablet by mouth 3 (Three) Times a Day. 6/27/23   Favian Dueñas APRN   ferrous sulfate 325 (65 FE) MG EC  tablet Take 1 tablet by mouth Daily With Breakfast. 10/24/23   Favian Dueñas APRN   fluticasone (FLONASE) 50 MCG/ACT nasal spray 2 sprays into the nostril(s) as directed by provider Daily. 12/19/23   Favian Dueñas APRN   folic acid (FOLVITE) 1 MG tablet Take 1 tablet by mouth Daily. 10/24/23   Favian Dueñas APRN   ibuprofen (ADVIL,MOTRIN) 800 MG tablet Take 1 tablet by mouth Every 6 (Six) Hours As Needed for Mild Pain or Moderate Pain. 9/25/23   Favian Dueñas APRN   ketorolac (TORADOL) 10 MG tablet Take 1 tablet by mouth Every 8 (Eight) Hours As Needed for Moderate Pain. 10/10/23   Favian Dueñas APRN   lamoTRIgine (LaMICtal) 25 MG tablet Take 1 tablet by mouth Daily. 12/7/23   Favian Dueñas APRN   methylphenidate LA (Ritalin LA) 30 MG 24 hr capsule Take 1 capsule by mouth Every Morning 9/11/23   Favian Dueñas APRN   montelukast (SINGULAIR) 10 MG tablet Take 1 tablet by mouth Every Night. 10/24/23   Favian Dueñas APRN   ondansetron ODT (ZOFRAN-ODT) 4 MG disintegrating tablet Place 1 tablet on the tongue Every 8 (Eight) Hours As Needed for Nausea or Vomiting. 8/24/23   Favian Dueñas APRN   sertraline (Zoloft) 25 MG tablet Take 1 tablet by mouth Daily. 11/6/23   Favian Dueñas APRN   vitamin D3 (Vitamin D) 125 MCG (5000 UT) capsule capsule Take 1 capsule by mouth Daily. 12/19/23   Favian Dueñas APRN           Objective   Physical Exam  28-year-old female awake alert.  Generally well-developed well-nourished.  Pupils equal round react light.  TMs right is clear left is erythematous.  Neck supple chest clear equal breath sounds cardiovascular regular rate and rhythm abdomen soft nontender.  She complains of pain left paraspinal musculature.  This is worse with movement.  Examination of legs reveal neurovascular grossly intact.  Reflexes 2+ symmetric  Procedures           ED Course                                             Medical Decision Making  Risk  Prescription drug management.    Review of patient's  chart shows she had CT scan September of this year.  Renal parenchyma was noted to be normal on contrast CT.  Patient's findings were discussed. X-rays were discussed.  There will be no expected benefit in light of previous CT scan and physical findings She was given injection of ketorolac she was discharged placed on Amoxil.  She was placed on Mobic and Flexeril.  Advise heat to sore.  To follow-up with prior provider return new or worsening symptoms    Final diagnoses:   Acute left-sided low back pain without sciatica   Non-recurrent acute suppurative otitis media of left ear without spontaneous rupture of tympanic membrane       ED Disposition  ED Disposition       ED Disposition   Discharge    Condition   Stable    Comment   --               Favian Dueñas, APRN  [address] [facility] [place] [place] [id number] [phone]    Schedule an appointment as soon as possible for a visit            Medication List        New Prescriptions      amoxicillin 875 MG tablet  Commonly known as: AMOXIL  Take 1 tablet by mouth 3 (Three) Times a Day.     meloxicam 15 MG tablet  Commonly known as: Mobic  Take 1 tablet by mouth Daily. Prn pain            Changed      * cyclobenzaprine 10 MG tablet  Commonly known as: FLEXERIL  Take 1 tablet by mouth 3 (Three) Times a Day.  What changed: Another medication with the same name was added. Make sure you understand how and when to take each.     * cyclobenzaprine 10 MG tablet  Commonly known as: FLEXERIL  Take 1 tablet by mouth 3 (Three) Times a Day As Needed for Muscle Spasms.  What changed: You were already taking a medication with the same name, and this prescription was added. Make sure you understand how and when to take each.           * This list has 2 medication(s) that are the same as other medications prescribed for you. Read the directions carefully, and ask your doctor or other care provider to review them with you.                   Where to Get Your Medications         These medications were sent to [facility] [address] [place] [address] [place] - [address] - [phone]  - [phone]   [address] [place] [address]      Phone: [phone]   amoxicillin 875 MG tablet  cyclobenzaprine 10 MG tablet  meloxicam 15 MG tablet            Moi Harrington MD  12/23/23 [id number]

## 2023-12-27 ENCOUNTER — HOSPITAL ENCOUNTER (OUTPATIENT)
Dept: MRI IMAGING | Facility: HOSPITAL | Age: 28
Discharge: HOME | End: 2023-12-27
Admitting: NURSE PRACTITIONER
Payer: MEDICAID

## 2023-12-27 DIAGNOSIS — E27.8 ADRENAL MASS, LEFT: ICD-10-CM

## 2023-12-27 PROCEDURE — A9579 GAD-BASE MR CONTRAST NOS,1ML: HCPCS | Performed by: NURSE PRACTITIONER

## 2023-12-27 PROCEDURE — 25010000002 GADOTERIDOL PER 1 ML: Performed by: NURSE PRACTITIONER

## 2023-12-27 PROCEDURE — 74183 MRI ABD W/O CNTR FLWD CNTR: CPT

## 2023-12-27 RX ADMIN — GADOTERIDOL 20 ML: 279.3 INJECTION, SOLUTION INTRAVENOUS at 13:03

## 2024-01-08 ENCOUNTER — OFFICE VISIT (OUTPATIENT)
Dept: FAMILY MEDICINE CLINIC | Facility: CLINIC | Age: 29
End: 2024-01-08
Payer: MEDICAID

## 2024-01-08 ENCOUNTER — TELEPHONE (OUTPATIENT)
Dept: FAMILY MEDICINE CLINIC | Facility: CLINIC | Age: 29
End: 2024-01-08

## 2024-01-08 VITALS
DIASTOLIC BLOOD PRESSURE: 88 MMHG | RESPIRATION RATE: 16 BRPM | HEART RATE: 80 BPM | OXYGEN SATURATION: 99 % | SYSTOLIC BLOOD PRESSURE: 124 MMHG | BODY MASS INDEX: 30.04 KG/M2 | WEIGHT: 191.4 LBS | HEIGHT: 67 IN | TEMPERATURE: 98.4 F

## 2024-01-08 DIAGNOSIS — Z13.31 SCREENING FOR DEPRESSION: ICD-10-CM

## 2024-01-08 DIAGNOSIS — E27.8 ADRENAL CYST: Primary | ICD-10-CM

## 2024-01-08 DIAGNOSIS — M54.41 CHRONIC MIDLINE LOW BACK PAIN WITH BILATERAL SCIATICA: ICD-10-CM

## 2024-01-08 DIAGNOSIS — F41.1 GAD (GENERALIZED ANXIETY DISORDER): ICD-10-CM

## 2024-01-08 DIAGNOSIS — R05.9 COUGH IN ADULT: ICD-10-CM

## 2024-01-08 DIAGNOSIS — F33.2 SEVERE EPISODE OF RECURRENT MAJOR DEPRESSIVE DISORDER, WITHOUT PSYCHOTIC FEATURES: Primary | ICD-10-CM

## 2024-01-08 DIAGNOSIS — G89.29 CHRONIC MIDLINE LOW BACK PAIN WITH BILATERAL SCIATICA: ICD-10-CM

## 2024-01-08 DIAGNOSIS — J02.9 SORE THROAT: ICD-10-CM

## 2024-01-08 DIAGNOSIS — R30.0 DYSURIA: ICD-10-CM

## 2024-01-08 DIAGNOSIS — M54.42 CHRONIC MIDLINE LOW BACK PAIN WITH BILATERAL SCIATICA: ICD-10-CM

## 2024-01-08 LAB
B-HCG UR QL: NEGATIVE
BILIRUB BLD-MCNC: NEGATIVE MG/DL
CLARITY, POC: CLEAR
COLOR UR: ABNORMAL
EXPIRATION DATE: NORMAL
FLUAV AG UPPER RESP QL IA.RAPID: NOT DETECTED
FLUBV AG UPPER RESP QL IA.RAPID: NOT DETECTED
GLUCOSE UR STRIP-MCNC: NEGATIVE MG/DL
INTERNAL CONTROL: NORMAL
INTERNAL CONTROL: NORMAL
INTERNAL NEGATIVE CONTROL: NORMAL
INTERNAL POSITIVE CONTROL: NORMAL
KETONES UR QL: NEGATIVE
LEUKOCYTE EST, POC: NEGATIVE
Lab: NORMAL
NITRITE UR-MCNC: NEGATIVE MG/ML
PH UR: 5.5 [PH] (ref 5–8)
PROT UR STRIP-MCNC: NEGATIVE MG/DL
RBC # UR STRIP: ABNORMAL /UL
S PYO AG THROAT QL: NEGATIVE
SARS-COV-2 AG UPPER RESP QL IA.RAPID: NOT DETECTED
SP GR UR: 1.03 (ref 1–1.03)
UROBILINOGEN UR QL: ABNORMAL

## 2024-01-08 PROCEDURE — 87428 SARSCOV & INF VIR A&B AG IA: CPT | Performed by: NURSE PRACTITIONER

## 2024-01-08 PROCEDURE — 87880 STREP A ASSAY W/OPTIC: CPT | Performed by: NURSE PRACTITIONER

## 2024-01-08 PROCEDURE — 99214 OFFICE O/P EST MOD 30 MIN: CPT | Performed by: NURSE PRACTITIONER

## 2024-01-08 PROCEDURE — 1160F RVW MEDS BY RX/DR IN RCRD: CPT | Performed by: NURSE PRACTITIONER

## 2024-01-08 PROCEDURE — T1015 CLINIC SERVICE: HCPCS | Performed by: NURSE PRACTITIONER

## 2024-01-08 PROCEDURE — 81025 URINE PREGNANCY TEST: CPT | Performed by: NURSE PRACTITIONER

## 2024-01-08 RX ORDER — CYCLOBENZAPRINE HCL 10 MG
10 TABLET ORAL 3 TIMES DAILY
Qty: 30 TABLET | Refills: 0 | Status: SHIPPED | OUTPATIENT
Start: 2024-01-08

## 2024-01-08 RX ORDER — MELOXICAM 15 MG/1
15 TABLET ORAL DAILY PRN
Qty: 10 TABLET | Refills: 0 | Status: SHIPPED | OUTPATIENT
Start: 2024-01-08

## 2024-01-08 RX ORDER — DULOXETIN HYDROCHLORIDE 20 MG/1
20 CAPSULE, DELAYED RELEASE ORAL DAILY
Qty: 30 CAPSULE | Refills: 0 | Status: SHIPPED | OUTPATIENT
Start: 2024-01-08

## 2024-01-08 NOTE — TELEPHONE ENCOUNTER
Discussed MRI abdomen results with patient which showed 1.8cm adrenal cyst that was previously found on CT scan. Denies fever, NVD, chills. Patient agrees on endocrinology referral for follow up and further evaluation and requests Dr. Vasquez here in [place] due to location and transportation.

## 2024-01-08 NOTE — PROGRESS NOTES
Deanna Bah  [id number]  1995  female     01/08/2024      Chief Complaint  Anxiety (Follow up PHQ & JONNA complete)    History of Present Illness  28-year-old female patient presents today complaining of sore throat and cough x 1 week.  States she would like to be tested for strep, flu, COVID.  Patient also complains of dysuria x 1 week.  Patient states she would like to have a urine pregnancy test performed.  Discussed MRI abdomen results with patient today which showed left 1.8 cm simple adrenal cyst.  Patient agreed on referral to endocrinologist.  Patient states she is out of meloxicam and out of Flexeril.  Patient states both helped her chronic low back pain.  Denies trauma or injury to low back.  Patient still complaining of low back pain.    Patient also complaining of depression and anxiety.  Patient screened for depression.  Patient states she has had thoughts of cutting herself previously but denies suicidal ideation today.  Denies intent of self-harm, intent of harming others, suicidal plan today.  Patient states she had an appointment at [email or web address] in [place] that I referred her to.  Patient states she was unable to make that appointment and states she needs to call them back to set up an appointment.  Reprinted referral to [email or web address] for patient today.  Patient wanting GeneSight test done today.    Denies fever, chills, body aches, headaches, lightheadedness, dizziness, shortness of breath, chest pain, abdominal pain, NVD, pelvic pain, hematuria.      Anxiety  Symptoms include nervous/anxious behavior and suicidal ideas. Patient reports no confusion, decreased concentration or shortness of breath.           Review of Systems   Constitutional: Negative.    HENT:  Positive for sore throat. Negative for congestion, ear discharge, ear pain, mouth sores, postnasal drip, rhinorrhea, sinus pressure, sinus pain, sneezing, tinnitus, trouble swallowing and voice change.    Eyes: Negative.     Respiratory:  Positive for cough. Negative for chest tightness, shortness of breath and wheezing.    Cardiovascular: Negative.    Gastrointestinal: Negative.    Endocrine: Negative.    Genitourinary:  Positive for dysuria. Negative for decreased urine volume, difficulty urinating, dyspareunia, enuresis, flank pain, frequency, genital sores, hematuria, menstrual problem, pelvic pain, urgency, vaginal bleeding, vaginal discharge and vaginal pain.   Musculoskeletal:  Positive for back pain. Negative for arthralgias, gait problem, joint swelling, myalgias, neck pain and neck stiffness.   Skin: Negative.    Neurological: Negative.    Hematological: Negative.    Psychiatric/Behavioral:  Positive for suicidal ideas. Negative for agitation, behavioral problems, confusion, decreased concentration, dysphoric mood, hallucinations, self-injury and sleep disturbance. The patient is nervous/anxious. The patient is not hyperactive.        Past Medical History:   Diagnosis Date    Anxiety     Asthma     Bipolar 1 disorder     Hypertension     Lordoscoliosis        Past Surgical History:   Procedure Laterality Date    KIDNEY STONE SURGERY      MOUTH SURGERY         Family History   Problem Relation Age of Onset    Hypertension Mother     Diabetes Mother     Liver cancer Father     Diabetes Sister     Liver cancer Maternal Grandmother     Aneurysm Maternal Grandfather     Breast cancer Paternal Grandmother        Social History     Socioeconomic History    Marital status: Single   Tobacco Use    Smoking status: Never    Smokeless tobacco: Never   Vaping Use    Vaping Use: Former    Substances: Flavoring    Devices: Disposable   Substance and Sexual Activity    Alcohol use: Never    Drug use: Never    Sexual activity: Yes     Partners: Male        Allergies   Allergen Reactions    Sertraline Other (See Comments)     Suicidal thoughts      Latex Rash         Objective   Vital Signs:   /88 (BP Location: Right arm, Patient  "Position: Sitting, Cuff Size: Adult)   Pulse 80   Temp 98.4 °F (36.9 °C) (Infrared)   Resp 16   Ht 170.2 cm (67\")   Wt 86.8 kg (191 lb 6.4 oz)   SpO2 99%   BMI 29.98 kg/m²       Physical Exam  Vitals and nursing note reviewed.   Constitutional:       General: She is not in acute distress.     Appearance: Normal appearance. She is not ill-appearing, toxic-appearing or diaphoretic.   HENT:      Head: Normocephalic and atraumatic.      Jaw: There is normal jaw occlusion.      Right Ear: Hearing and external ear normal.      Left Ear: Hearing and external ear normal.      Nose: Nose normal.      Mouth/Throat:      Lips: Pink.   Eyes:      General: Lids are normal. Vision grossly intact. Gaze aligned appropriately.      Extraocular Movements: Extraocular movements intact.      Conjunctiva/sclera: Conjunctivae normal.      Pupils: Pupils are equal, round, and reactive to light.   Cardiovascular:      Rate and Rhythm: Normal rate and regular rhythm.      Pulses: Normal pulses.           Carotid pulses are 2+ on the right side and 2+ on the left side.       Radial pulses are 2+ on the right side and 2+ on the left side.        Dorsalis pedis pulses are 2+ on the right side and 2+ on the left side.        Posterior tibial pulses are 2+ on the right side and 2+ on the left side.      Heart sounds: Normal heart sounds, S1 normal and S2 normal. No murmur heard.  Pulmonary:      Effort: Pulmonary effort is normal.      Breath sounds: Normal breath sounds and air entry.   Abdominal:      General: Abdomen is flat. Bowel sounds are normal. There is no distension or abdominal bruit.      Palpations: Abdomen is soft.      Tenderness: There is no abdominal tenderness. There is no guarding or rebound.   Musculoskeletal:         General: Normal range of motion.      Cervical back: Normal, full passive range of motion without pain, normal range of motion and neck supple.      Thoracic back: Normal.      Lumbar back: Tenderness " present. No spasms.      Right lower leg: No edema.      Left lower leg: No edema.   Skin:     General: Skin is warm and dry.      Capillary Refill: Capillary refill takes less than 2 seconds.      Coloration: Skin is not cyanotic or pale.      Findings: No bruising, erythema or rash.   Neurological:      General: No focal deficit present.      Mental Status: She is alert and oriented to person, place, and time. Mental status is at baseline.      GCS: GCS eye subscore is 4. GCS verbal subscore is 5. GCS motor subscore is 6.      Cranial Nerves: Cranial nerves 2-12 are intact. No cranial nerve deficit.      Sensory: Sensation is intact. No sensory deficit.      Motor: Motor function is intact. No weakness.      Coordination: Coordination is intact. Coordination normal.      Gait: Gait is intact. Gait normal.      Deep Tendon Reflexes: Reflexes normal.   Psychiatric:         Attention and Perception: Attention and perception normal.         Mood and Affect: Mood is anxious and depressed. Mood is not elated. Affect is not labile, blunt, flat, angry, tearful or inappropriate.         Speech: Speech normal.         Behavior: Behavior normal. Behavior is cooperative.         Thought Content: Thought content normal.         Cognition and Memory: Cognition and memory normal.         Judgment: Judgment normal.                 Assessment and Plan   Diagnoses and all orders for this visit:    1. Severe episode of recurrent major depressive disorder, without psychotic features (Primary)  -     GeneSight - Swab, Oral Cavity    2. JONNA (generalized anxiety disorder)  -     [facility] - Swab, Oral Cavity    3. Cough in adult  -     POCT SARS-CoV-2 Antigen JULIETA + Flu    4. Dysuria  -     POCT pregnancy, urine  -     POCT urinalysis dipstick, multipro    5. Sore throat  -     POCT rapid strep A    6. Chronic midline low back pain with bilateral sciatica  -     meloxicam (Mobic) 15 MG tablet; Take 1 tablet by mouth Daily As Needed for  Mild Pain or Moderate Pain.  Dispense: 10 tablet; Refill: 0  -     cyclobenzaprine (FLEXERIL) 10 MG tablet; Take 1 tablet by mouth 3 (Three) Times a Day.  Dispense: 30 tablet; Refill: 0    7. Screening for depression    Other orders  -     DULoxetine (Cymbalta) 20 MG capsule; Take 1 capsule by mouth Daily.  Dispense: 30 capsule; Refill: 0    -UA and urine hCG negative today.  -Strep, flu, COVID-negative today.  -Pending [facility] test.  -Discussed MRI abdomen results with patient today.  -Referral sent to endocrinologist Dr. Vasquez today for follow-up on adrenal cyst.  -Treating chronic low back pain with meloxicam and Flexeril muscle relaxer as needed.  Instructed do not drive while taking muscle relaxer and do not take over-the-counter NSAIDs while taking meloxicam.  Instructed to apply ice to affected area 2-3 times a day for 20 minutes at a time.  Instructed to use over-the-counter Tiger balm and over-the-counter lidocaine patches to apply to affected area as needed.  -Denies suicidal ideation today and suicidal plan.  -Treating major depression and generalized anxiety disorder with Cymbalta.  -Reprinted behavioral health referral to [facility] in [place] and instructed patient importance of setting up appointment with them today.  -Discussed ER red flags.  -Follow-up with me in 4 weeks.    Follow Up   Return in about 4 weeks (around 2/5/2024) for Recheck.    Patient Instructions   RICE Therapy for Routine Care of Injuries  Many injuries can be cared for with rest, ice, compression, and elevation (RICE therapy). This includes:  Resting the injured body part.  Putting ice on the injury.  Putting pressure (compression) on the injury.  Raising the injured part (elevation).  Using RICE therapy can help to lessen pain and swelling.  Supplies needed:  Ice.  Plastic bag.  Towel.  Elastic bandage.  Pillow or pillows to raise your injured body part.  How to care for your injury with RICE therapy  Rest  Try to rest the  injured part of your body. You can go back to your normal activities when your doctor says it is okay to do them and when you can do them without pain.  If you rest the injury too much, it may not heal as well. Some injuries heal better with early movement instead of resting for too long. Ask your doctor if you should do exercises to help your injury get better.  Ice    If told, put ice on the injured area. To do this:  Put ice in a plastic bag.  Place a towel between your skin and the bag.  Leave the ice on for 20 minutes, 2-3 times a day.  Take off the ice if your skin turns bright red. This is very important. If you cannot feel pain, heat, or cold, you have a greater risk of damage to the area.  Do not put ice on your bare skin. Use ice for as many days as your doctor tells you to use it.  Compression  Put pressure on the injured area. This can be done with an elastic bandage. If this type of bandage has been put on your injury:  Follow instructions on the package the bandage came in about how to use it.  Do not wrap the bandage too tightly.  Wrap the bandage more loosely if part of your body beyond the bandage is blue, swollen, cold, painful, or loses feeling.  Take off the bandage and put it on again every 3-4 hours or as told by your doctor.  See your doctor if the bandage seems to make your problems worse.    Elevation  Raise the injured area above the level of your heart while you are sitting or lying down.  Follow these instructions at home:  If your symptoms get worse or last a long time, make a follow-up appointment with your doctor. You may need to have imaging tests, such as X-rays or an MRI.  If you have imaging tests, ask how to get your results when they are ready.  Return to your normal activities when your doctor says that it is safe.  Keep all follow-up visits.  Contact a doctor if:  You keep having pain and swelling.  Your symptoms get worse.  Get help right away if:  You have sudden, very bad  pain at your injury or lower than your injury.  You have redness or more swelling around your injury.  You have tingling or numbness at your injury or lower than your injury, and it does not go away when you take off the bandage.  Summary  Many injuries can be cared for using rest, ice, compression, and elevation (RICE therapy).  You can go back to your normal activities when your doctor says it is okay and when you can do them without pain.  Put ice on the injured area as told by your doctor.  Get help if your symptoms get worse or if you keep having pain and swelling.  This information is not intended to replace advice given to you by your health care provider. Make sure you discuss any questions you have with your health care provider.  Document Revised: 10/07/2021 Document Reviewed: 10/07/2021  Elsevier Patient Education © 2022 [employer]

## 2024-01-15 ENCOUNTER — TELEPHONE (OUTPATIENT)
Dept: FAMILY MEDICINE CLINIC | Facility: CLINIC | Age: 29
End: 2024-01-15
Payer: MEDICAID

## 2024-01-15 DIAGNOSIS — N89.8 VAGINAL ITCHING: Primary | ICD-10-CM

## 2024-01-15 DIAGNOSIS — B37.31 VAGINAL YEAST INFECTION: ICD-10-CM

## 2024-01-15 NOTE — TELEPHONE ENCOUNTER
Caller: Deanna Bah I    Relationship: Self    Best call back number: [phone]     What was the call regarding: PATIENT STATED THE meloxicam (Mobic) 15 MG tablet IS NOT WORKING.     PATIENT WOULD LIKE TO KNOW NEXT STEP IN PLAN OF CARE.     PLEASE CALL TO DISCUSS.

## 2024-01-16 RX ORDER — FLUCONAZOLE 150 MG/1
TABLET ORAL
Qty: 2 TABLET | Refills: 0 | Status: SHIPPED | OUTPATIENT
Start: 2024-01-16

## 2024-01-16 NOTE — TELEPHONE ENCOUNTER
Caller: Deanna Bah I    Relationship: Self    Best call back number: [phone]    What medication are you requesting: DIFLUCAN    What are your current symptoms: VAGINAL ITCHING    If a prescription is needed, what is your preferred pharmacy and phone number:    [facility] [address] [place] [address]  [place]  [phone] [facility] [phone]  [phone]     Additional notes:    WOULD ALSO LIKE A CALL BACK ON MELOXICAM NOT HELPING FOR HER BACK PAIN

## 2024-01-16 NOTE — TELEPHONE ENCOUNTER
HUB IS INSTRUCTED TO RELAY BELOW MESSAGE     IF PT CALLS BACK SHE WILL NEED TO MAKE APPOINTMENT.  PLEASE SCHEDULE NEXT AVAILABLE WITH HARRY 30 MIN SLOT FOLLOW UP ON MEDS. THANK YOU

## 2024-01-17 NOTE — TELEPHONE ENCOUNTER
Name: Bah, Deanna BUSH    Relationship: Self    Best Callback Number:     [phone]       [facility] PROVIDED THE RELAY MESSAGE FROM THE OFFICE   PATIENT SCHEDULED AS REQUESTED

## 2024-02-06 ENCOUNTER — TELEPHONE (OUTPATIENT)
Dept: FAMILY MEDICINE CLINIC | Facility: CLINIC | Age: 29
End: 2024-02-06

## 2024-03-01 RX ORDER — IBUPROFEN 800 MG/1
TABLET ORAL
Qty: 30 TABLET | Refills: 0 | Status: SHIPPED | OUTPATIENT
Start: 2024-03-01

## 2024-03-11 ENCOUNTER — OFFICE VISIT (OUTPATIENT)
Dept: FAMILY MEDICINE CLINIC | Facility: CLINIC | Age: 29
End: 2024-03-11
Payer: MEDICAID

## 2024-03-11 VITALS
HEIGHT: 67 IN | TEMPERATURE: 97.7 F | BODY MASS INDEX: 31.08 KG/M2 | OXYGEN SATURATION: 99 % | HEART RATE: 74 BPM | WEIGHT: 198 LBS | SYSTOLIC BLOOD PRESSURE: 108 MMHG | DIASTOLIC BLOOD PRESSURE: 75 MMHG

## 2024-03-11 DIAGNOSIS — H65.93 BILATERAL OTITIS MEDIA WITH EFFUSION: ICD-10-CM

## 2024-03-11 DIAGNOSIS — M54.42 CHRONIC MIDLINE LOW BACK PAIN WITH BILATERAL SCIATICA: ICD-10-CM

## 2024-03-11 DIAGNOSIS — J02.9 SORE THROAT: ICD-10-CM

## 2024-03-11 DIAGNOSIS — J06.9 UPPER RESPIRATORY TRACT INFECTION, UNSPECIFIED TYPE: Primary | ICD-10-CM

## 2024-03-11 DIAGNOSIS — Z76.0 MEDICATION REFILL: ICD-10-CM

## 2024-03-11 DIAGNOSIS — M54.41 CHRONIC MIDLINE LOW BACK PAIN WITH BILATERAL SCIATICA: ICD-10-CM

## 2024-03-11 DIAGNOSIS — G89.29 CHRONIC MIDLINE LOW BACK PAIN WITH BILATERAL SCIATICA: ICD-10-CM

## 2024-03-11 LAB
EXPIRATION DATE: NORMAL
FLUAV AG NPH QL: NEGATIVE
FLUBV AG NPH QL: NEGATIVE
INTERNAL CONTROL: NORMAL
Lab: NORMAL
S PYO AG THROAT QL: NEGATIVE
SARS-COV-2 AG UPPER RESP QL IA.RAPID: NOT DETECTED

## 2024-03-11 RX ORDER — CELECOXIB 50 MG/1
50 CAPSULE ORAL 2 TIMES DAILY PRN
Qty: 30 CAPSULE | Refills: 0 | Status: SHIPPED | OUTPATIENT
Start: 2024-03-11

## 2024-03-11 RX ORDER — CETIRIZINE HYDROCHLORIDE 10 MG/1
10 TABLET ORAL NIGHTLY
Qty: 90 TABLET | Refills: 2 | Status: SHIPPED | OUTPATIENT
Start: 2024-03-11

## 2024-03-11 RX ORDER — AMOXICILLIN AND CLAVULANATE POTASSIUM 875; 125 MG/1; MG/1
1 TABLET, FILM COATED ORAL 2 TIMES DAILY
Qty: 20 TABLET | Refills: 0 | Status: SHIPPED | OUTPATIENT
Start: 2024-03-11

## 2024-03-11 RX ORDER — FLUTICASONE PROPIONATE 50 MCG
2 SPRAY, SUSPENSION (ML) NASAL DAILY
Qty: 18.2 ML | Refills: 2 | Status: SHIPPED | OUTPATIENT
Start: 2024-03-11

## 2024-03-12 NOTE — PROGRESS NOTES
Deanna Bah  [id number]  1995  female     03/11/2024      Chief Complaint  Earache and Sore Throat    History of Present Illness  28 year old female patient presents today to follow up on anxiety. States her anxiety is doing good with her current dose of Xanax. Complains of earache, sore throat, cough x 3 days. Denies fever, chills, body aches, headache, wheezing, shortness of breath, chest pain, abdominal pain, NVD, dysuria, rash, or any other symptoms.       Review of Systems   Constitutional: Negative.    HENT: Negative.  Negative for facial swelling, sinus pain, sneezing and voice change.    Eyes: Negative.    Respiratory: Negative.     Cardiovascular: Negative.    Gastrointestinal: Negative.    Endocrine: Negative.    Genitourinary: Negative.    Musculoskeletal: Negative.    Skin: Negative.    Neurological: Negative.    Hematological: Negative.    Psychiatric/Behavioral: Negative.         Past Medical History:   Diagnosis Date    Anxiety     Asthma     Bipolar 1 disorder     Hypertension     Lordoscoliosis        Past Surgical History:   Procedure Laterality Date    KIDNEY STONE SURGERY      MOUTH SURGERY         Family History   Problem Relation Age of Onset    Hypertension Mother     Diabetes Mother     Liver cancer Father     Diabetes Sister     Liver cancer Maternal Grandmother     Aneurysm Maternal Grandfather     Breast cancer Paternal Grandmother        Social History     Socioeconomic History    Marital status: Single   Tobacco Use    Smoking status: Never    Smokeless tobacco: Never   Vaping Use    Vaping status: Former    Substances: Flavoring    Devices: Disposable   Substance and Sexual Activity    Alcohol use: Never    Drug use: Never    Sexual activity: Yes     Partners: Male        Allergies   Allergen Reactions    Sertraline Other (See Comments)     Suicidal thoughts      Latex Rash         Objective   Vital Signs:   /75 (BP Location: Right arm, Patient Position: Sitting, Cuff  "Size: Large Adult)   Pulse 74   Temp 97.7 °F (36.5 °C) (Temporal)   Ht 170.2 cm (67\")   Wt 89.8 kg (198 lb)   SpO2 99%   BMI 31.01 kg/m²       Physical Exam  Vitals and nursing note reviewed.   Constitutional:       General: She is not in acute distress.     Appearance: Normal appearance. She is not ill-appearing, toxic-appearing or diaphoretic.   HENT:      Head: Normocephalic and atraumatic.      Jaw: There is normal jaw occlusion.      Right Ear: Hearing, ear canal and external ear normal. A middle ear effusion is present. Tympanic membrane is erythematous and bulging.      Left Ear: Hearing, ear canal and external ear normal. A middle ear effusion is present. Tympanic membrane is erythematous and bulging.      Nose: Nose normal.      Mouth/Throat:      Lips: Pink.   Eyes:      General: Lids are normal. Vision grossly intact. Gaze aligned appropriately.      Extraocular Movements: Extraocular movements intact.      Conjunctiva/sclera: Conjunctivae normal.      Pupils: Pupils are equal, round, and reactive to light.   Cardiovascular:      Rate and Rhythm: Normal rate and regular rhythm.      Pulses: Normal pulses.           Carotid pulses are 2+ on the right side and 2+ on the left side.       Radial pulses are 2+ on the right side and 2+ on the left side.        Dorsalis pedis pulses are 2+ on the right side and 2+ on the left side.        Posterior tibial pulses are 2+ on the right side and 2+ on the left side.      Heart sounds: Normal heart sounds, S1 normal and S2 normal. No murmur heard.  Pulmonary:      Effort: Pulmonary effort is normal. No tachypnea or respiratory distress.      Breath sounds: Normal breath sounds and air entry.   Abdominal:      General: Abdomen is flat. Bowel sounds are normal. There is no distension or abdominal bruit.      Palpations: Abdomen is soft.      Tenderness: There is no abdominal tenderness.   Musculoskeletal:         General: Normal range of motion.      Cervical " back: Full passive range of motion without pain, normal range of motion and neck supple.      Right lower leg: No edema.      Left lower leg: No edema.   Skin:     General: Skin is warm and dry.      Capillary Refill: Capillary refill takes less than 2 seconds.      Coloration: Skin is not cyanotic or pale.      Findings: No bruising, erythema or rash.   Neurological:      General: No focal deficit present.      Mental Status: She is alert and oriented to person, place, and time. Mental status is at baseline.      GCS: GCS eye subscore is 4. GCS verbal subscore is 5. GCS motor subscore is 6.      Cranial Nerves: Cranial nerves 2-12 are intact. No cranial nerve deficit.      Sensory: Sensation is intact. No sensory deficit.      Motor: Motor function is intact. No weakness.      Coordination: Coordination is intact. Coordination normal.      Gait: Gait is intact. Gait normal.      Deep Tendon Reflexes: Reflexes normal.   Psychiatric:         Attention and Perception: Attention and perception normal.         Mood and Affect: Mood and affect normal.         Speech: Speech normal.         Behavior: Behavior normal. Behavior is cooperative.         Thought Content: Thought content normal.         Cognition and Memory: Cognition and memory normal.         Judgment: Judgment normal.                 Assessment and Plan   Diagnoses and all orders for this visit:    1. Upper respiratory tract infection, unspecified type (Primary)  -     amoxicillin-clavulanate (AUGMENTIN) 875-125 MG per tablet; Take 1 tablet by mouth 2 (Two) Times a Day.  Dispense: 20 tablet; Refill: 0    2. Bilateral otitis media with effusion  -     fluticasone (FLONASE) 50 MCG/ACT nasal spray; 2 sprays into the nostril(s) as directed by provider Daily.  Dispense: 18.2 mL; Refill: 2  -     cetirizine (zyrTEC) 10 MG tablet; Take 1 tablet by mouth Every Night.  Dispense: 90 tablet; Refill: 2  -     amoxicillin-clavulanate (AUGMENTIN) 875-125 MG per tablet;  Take 1 tablet by mouth 2 (Two) Times a Day.  Dispense: 20 tablet; Refill: 0    3. Sore throat  -     POC Influenza A / B  -     POCT rapid strep A  -     POCT SARS-CoV-2 Antigen JULIETA    4. Medication refill  -     fluticasone (FLONASE) 50 MCG/ACT nasal spray; 2 sprays into the nostril(s) as directed by provider Daily.  Dispense: 18.2 mL; Refill: 2  -     cetirizine (zyrTEC) 10 MG tablet; Take 1 tablet by mouth Every Night.  Dispense: 90 tablet; Refill: 2    5. Chronic midline low back pain with bilateral sciatica  -     celecoxib (CeleBREX) 50 MG capsule; Take 1 capsule by mouth 2 (Two) Times a Day As Needed for Mild Pain or Moderate Pain.  Dispense: 30 capsule; Refill: 0    -Negative for strep, flu, and covid.  -Treating URI and otitis media with Augmentin ABX, flonase, zyrtec.   -Treating chronic low back pain with Celebrex.   -Anxiety stable. Continue Xanax 0.5mg BID PRN.   -Discussed ER red flags.   -Instructed to follow up with me in 3 months for anxiety.     Follow Up   Return in about 3 months (around 6/11/2024) for Recheck.    There are no Patient Instructions on file for this visit.

## 2024-03-15 DIAGNOSIS — F41.0 PANIC ATTACKS: ICD-10-CM

## 2024-03-15 DIAGNOSIS — F41.1 GAD (GENERALIZED ANXIETY DISORDER): ICD-10-CM

## 2024-03-15 DIAGNOSIS — Z76.0 MEDICATION REFILL: ICD-10-CM

## 2024-03-19 ENCOUNTER — PRIOR AUTHORIZATION (OUTPATIENT)
Dept: FAMILY MEDICINE CLINIC | Facility: CLINIC | Age: 29
End: 2024-03-19
Payer: MEDICAID

## 2024-03-19 RX ORDER — ALPRAZOLAM 0.5 MG/1
0.5 TABLET ORAL EVERY 12 HOURS
Qty: 60 TABLET | Refills: 0 | Status: SHIPPED | OUTPATIENT
Start: 2024-03-19

## 2024-03-19 NOTE — TELEPHONE ENCOUNTER
Deanna Bah (Key: [id number]) - [id number]  ALPRAZolam 0.5MG tablets  Status: PA RequestCreated: March 19th, 2024 [id number]: March 19th, 2024

## 2024-03-19 NOTE — TELEPHONE ENCOUNTER
Caller: Deanna Bah RACHELLE    Relationship: Self    Best call back number: [phone]     Requested Prescriptions:   Requested Prescriptions     Pending Prescriptions Disp Refills    ALPRAZolam (XANAX) 0.5 MG tablet [Pharmacy Med Name: ALPRAZolam 0.5 MG Oral Tablet] 60 tablet 0     Sig: TAKE 1 TABLET BY MOUTH EVERY 12 HOURS        Pharmacy where request should be sent: [address] [phone] [facility] [phone] FX     Last office visit with prescribing clinician: 3/11/2024   Last telemedicine visit with prescribing clinician: Visit date not found   Next office visit with prescribing clinician: 6/13/2024     Additional details provided by patient: PATIENT WILL BE OUT OF MEDICATION AFTER TODAY. PLEASE ADVISE.     Does the patient have less than a 3 day supply:  [x] Yes  [] No    Would you like a call back once the refill request has been completed: [] Yes [x] No    If the office needs to give you a call back, can they leave a voicemail: [] Yes [x] No    Oliver Cristina Rep   03/19/24 10:05 EDT

## 2024-03-19 NOTE — TELEPHONE ENCOUNTER
Date of last refill: 2/15/24    Is there an Inspect on file: INSPECT - SCAN - INSPECT 3/19/24 (03/19/2024)      Last Appointment: 3/11/24    Next Appointment: 6/13/24       Additional information:

## 2024-03-27 ENCOUNTER — TELEPHONE (OUTPATIENT)
Dept: FAMILY MEDICINE CLINIC | Facility: CLINIC | Age: 29
End: 2024-03-27

## 2024-03-27 NOTE — TELEPHONE ENCOUNTER
Caller: Deanna Bah I    Relationship: Self    Best call back number: [phone]    Who are you requesting to speak with (clinical staff, provider,  specific staff member): CLINICAL STAFF    What was the call regarding: STATED THAT THEY HAD AN UNDER THE SKIN PIMPLE THAT THEY HAD SQUEEZED AND POPPED. STATED THAT THEY HAD A GOOD AMOUNT OF WHITE PUSS AND BLOOD THAT CAME OUT OF IT. STATED THAT IT IS A PRETTY BIG SPOT AND THEY WOULD LIKE TO KNOW WHAT THEY SHOULD DO. PLEASE CALL AND ADVISE

## 2024-04-05 ENCOUNTER — HOSPITAL ENCOUNTER (EMERGENCY)
Facility: HOSPITAL | Age: 29
Discharge: HOME | End: 2024-04-05
Attending: EMERGENCY MEDICINE
Payer: MEDICAID

## 2024-04-05 VITALS
TEMPERATURE: 97.8 F | SYSTOLIC BLOOD PRESSURE: 126 MMHG | WEIGHT: 201.06 LBS | OXYGEN SATURATION: 99 % | HEIGHT: 67 IN | HEART RATE: 65 BPM | BODY MASS INDEX: 31.56 KG/M2 | DIASTOLIC BLOOD PRESSURE: 80 MMHG | RESPIRATION RATE: 18 BRPM

## 2024-04-05 DIAGNOSIS — M54.50 LEFT-SIDED LOW BACK PAIN WITHOUT SCIATICA, UNSPECIFIED CHRONICITY: ICD-10-CM

## 2024-04-05 DIAGNOSIS — R07.9 CHEST PAIN, UNSPECIFIED TYPE: Primary | ICD-10-CM

## 2024-04-05 LAB
ALBUMIN SERPL-MCNC: 4.4 G/DL (ref 3.5–5.2)
ALBUMIN/GLOB SERPL: 1.6 G/DL
ALP SERPL-CCNC: 67 U/L (ref 39–117)
ALT SERPL W P-5'-P-CCNC: 15 U/L (ref 1–33)
ANION GAP SERPL CALCULATED.3IONS-SCNC: 10 MMOL/L (ref 5–15)
AST SERPL-CCNC: 14 U/L (ref 1–32)
BASOPHILS # BLD AUTO: 0.03 10*3/MM3 (ref 0–0.2)
BASOPHILS NFR BLD AUTO: 0.3 % (ref 0–1.5)
BILIRUB SERPL-MCNC: 0.4 MG/DL (ref 0–1.2)
BILIRUB UR QL STRIP: NEGATIVE
BUN SERPL-MCNC: 8 MG/DL (ref 6–20)
BUN/CREAT SERPL: 11.3 (ref 7–25)
CALCIUM SPEC-SCNC: 9.5 MG/DL (ref 8.6–10.5)
CHLORIDE SERPL-SCNC: 104 MMOL/L (ref 98–107)
CLARITY UR: CLEAR
CO2 SERPL-SCNC: 24 MMOL/L (ref 22–29)
COLOR UR: YELLOW
CREAT SERPL-MCNC: 0.71 MG/DL (ref 0.57–1)
DEPRECATED RDW RBC AUTO: 43.3 FL (ref 37–54)
EGFRCR SERPLBLD CKD-EPI 2021: 119.7 ML/MIN/1.73
EOSINOPHIL # BLD AUTO: 0.13 10*3/MM3 (ref 0–0.4)
EOSINOPHIL NFR BLD AUTO: 1.5 % (ref 0.3–6.2)
ERYTHROCYTE [DISTWIDTH] IN BLOOD BY AUTOMATED COUNT: 12.6 % (ref 12.3–15.4)
GLOBULIN UR ELPH-MCNC: 2.7 GM/DL
GLUCOSE SERPL-MCNC: 89 MG/DL (ref 65–99)
GLUCOSE UR STRIP-MCNC: NEGATIVE MG/DL
HCT VFR BLD AUTO: 42.9 % (ref 34–46.6)
HGB BLD-MCNC: 13.6 G/DL (ref 12–15.9)
HGB UR QL STRIP.AUTO: NEGATIVE
IMM GRANULOCYTES # BLD AUTO: 0.02 10*3/MM3 (ref 0–0.05)
IMM GRANULOCYTES NFR BLD AUTO: 0.2 % (ref 0–0.5)
KETONES UR QL STRIP: NEGATIVE
LEUKOCYTE ESTERASE UR QL STRIP.AUTO: NEGATIVE
LIPASE SERPL-CCNC: 42 U/L (ref 13–60)
LYMPHOCYTES # BLD AUTO: 1.56 10*3/MM3 (ref 0.7–3.1)
LYMPHOCYTES NFR BLD AUTO: 18 % (ref 19.6–45.3)
MCH RBC QN AUTO: 29.6 PG (ref 26.6–33)
MCHC RBC AUTO-ENTMCNC: 31.7 G/DL (ref 31.5–35.7)
MCV RBC AUTO: 93.5 FL (ref 79–97)
MONOCYTES # BLD AUTO: 0.79 10*3/MM3 (ref 0.1–0.9)
MONOCYTES NFR BLD AUTO: 9.1 % (ref 5–12)
NEUTROPHILS NFR BLD AUTO: 6.12 10*3/MM3 (ref 1.7–7)
NEUTROPHILS NFR BLD AUTO: 70.9 % (ref 42.7–76)
NITRITE UR QL STRIP: NEGATIVE
NRBC BLD AUTO-RTO: 0 /100 WBC (ref 0–0.2)
PH UR STRIP.AUTO: 5.5 [PH] (ref 5–8)
PLATELET # BLD AUTO: 293 10*3/MM3 (ref 140–450)
PMV BLD AUTO: 9.6 FL (ref 6–12)
POTASSIUM SERPL-SCNC: 4.2 MMOL/L (ref 3.5–5.2)
PROT SERPL-MCNC: 7.1 G/DL (ref 6–8.5)
PROT UR QL STRIP: NEGATIVE
RBC # BLD AUTO: 4.59 10*6/MM3 (ref 3.77–5.28)
SODIUM SERPL-SCNC: 138 MMOL/L (ref 136–145)
SP GR UR STRIP: 1.01 (ref 1–1.03)
TROPONIN T SERPL HS-MCNC: <6 NG/L
UROBILINOGEN UR QL STRIP: NORMAL
WBC NRBC COR # BLD AUTO: 8.65 10*3/MM3 (ref 3.4–10.8)
WHOLE BLOOD HOLD COAG: NORMAL

## 2024-04-05 PROCEDURE — 93005 ELECTROCARDIOGRAM TRACING: CPT | Performed by: EMERGENCY MEDICINE

## 2024-04-05 PROCEDURE — 96374 THER/PROPH/DIAG INJ IV PUSH: CPT

## 2024-04-05 PROCEDURE — 80053 COMPREHEN METABOLIC PANEL: CPT

## 2024-04-05 PROCEDURE — 99283 EMERGENCY DEPT VISIT LOW MDM: CPT

## 2024-04-05 PROCEDURE — 84484 ASSAY OF TROPONIN QUANT: CPT

## 2024-04-05 PROCEDURE — 25010000002 KETOROLAC TROMETHAMINE PER 15 MG

## 2024-04-05 PROCEDURE — 81003 URINALYSIS AUTO W/O SCOPE: CPT

## 2024-04-05 PROCEDURE — 25810000003 SODIUM CHLORIDE 0.9 % SOLUTION

## 2024-04-05 PROCEDURE — 93005 ELECTROCARDIOGRAM TRACING: CPT

## 2024-04-05 PROCEDURE — 83690 ASSAY OF LIPASE: CPT

## 2024-04-05 PROCEDURE — 85025 COMPLETE CBC W/AUTO DIFF WBC: CPT

## 2024-04-05 RX ORDER — SODIUM CHLORIDE 0.9 % (FLUSH) 0.9 %
10 SYRINGE (ML) INJECTION AS NEEDED
Status: DISCONTINUED | OUTPATIENT
Start: 2024-04-05 | End: 2024-04-05 | Stop reason: HOSPADM

## 2024-04-05 RX ORDER — TIZANIDINE 4 MG/1
4 TABLET ORAL ONCE
Status: COMPLETED | OUTPATIENT
Start: 2024-04-05 | End: 2024-04-05

## 2024-04-05 RX ORDER — TIZANIDINE HYDROCHLORIDE 2 MG/1
2 CAPSULE, GELATIN COATED ORAL 3 TIMES DAILY PRN
Qty: 14 CAPSULE | Refills: 0 | Status: SHIPPED | OUTPATIENT
Start: 2024-04-05

## 2024-04-05 RX ORDER — KETOROLAC TROMETHAMINE 30 MG/ML
15 INJECTION, SOLUTION INTRAMUSCULAR; INTRAVENOUS ONCE
Status: COMPLETED | OUTPATIENT
Start: 2024-04-05 | End: 2024-04-05

## 2024-04-05 RX ADMIN — KETOROLAC TROMETHAMINE 15 MG: 30 INJECTION, SOLUTION INTRAMUSCULAR at 19:16

## 2024-04-05 RX ADMIN — SODIUM CHLORIDE 500 ML: 9 INJECTION, SOLUTION INTRAVENOUS at 19:18

## 2024-04-05 RX ADMIN — TIZANIDINE 4 MG: 4 TABLET ORAL at 20:01

## 2024-04-05 NOTE — ED PROVIDER NOTES
Subjective   History of Present Illness  Chief Complaint: Flank pain, chest pain      HPI: Patient is a 28-year-old female who presents by private vehicle with a known history of hypertension, bipolar 1, asthma anxiety with complaints of left-sided flank pain she states has been ongoing for the last 2 days she denies urinary symptoms she has had no vaginal bleeding or discharge.  She states she has a known history of renal cyst.  She states after she eats she continues to feel hungry.  She also reports some substernal chest pain.  She has had no associated shortness of breath.  No attempted treatment prior to arrival.    PCP: Spenser    History provided by:  Patient      Review of Systems  See above as noted     Past Medical History:   Diagnosis Date    Anxiety     Asthma     Bipolar 1 disorder     Hypertension     Lordoscoliosis        Allergies   Allergen Reactions    Sertraline Other (See Comments)     Suicidal thoughts      Latex Rash       Past Surgical History:   Procedure Laterality Date    KIDNEY STONE SURGERY      MOUTH SURGERY         Family History   Problem Relation Age of Onset    Hypertension Mother     Diabetes Mother     Liver cancer Father     Diabetes Sister     Liver cancer Maternal Grandmother     Aneurysm Maternal Grandfather     Breast cancer Paternal Grandmother        Social History     Socioeconomic History    Marital status: Single   Tobacco Use    Smoking status: Never    Smokeless tobacco: Never   Vaping Use    Vaping status: Former    Substances: Flavoring    Devices: Disposable   Substance and Sexual Activity    Alcohol use: Never    Drug use: Never    Sexual activity: Yes     Partners: Male           Objective   Physical Exam  Vitals reviewed.   Constitutional:       Appearance: She is obese.   HENT:      Head: Normocephalic.   Eyes:      Extraocular Movements: Extraocular movements intact.      Pupils: Pupils are equal, round, and reactive to light.   Cardiovascular:      Rate and  "Rhythm: Normal rate.      Pulses: Normal pulses.   Pulmonary:      Effort: Pulmonary effort is normal.   Abdominal:      General: Bowel sounds are normal.      Palpations: Abdomen is soft. There is no mass.      Tenderness: There is no abdominal tenderness. There is no right CVA tenderness or left CVA tenderness.   Musculoskeletal:        Arms:       Cervical back: Neck supple.      Comments: Reported pain on palpation to the left paraspinal no bony step-offs or crepitus no midline discomfort.  Bilateral lower extremity strength equal, ambulates independently without difficulty.   Skin:     General: Skin is warm and dry.      Capillary Refill: Capillary refill takes less than 2 seconds.   Neurological:      General: No focal deficit present.      Mental Status: She is alert and oriented to person, place, and time. Mental status is at baseline.         Procedures  EKG was obtained sinus rhythm with rate of 76 no ST elevation noted.  Reviewed with Dr. Seo who agrees with interpretation.                 ED Course      /65   Pulse 62   Temp 97.8 °F (36.6 °C) (Oral)   Resp 18   Ht 170.2 cm (67\")   Wt 91.2 kg (201 lb 1 oz)   LMP  (LMP Unknown)   SpO2 100%   BMI 31.49 kg/m²   Labs Reviewed   CBC WITH AUTO DIFFERENTIAL - Abnormal; Notable for the following components:       Result Value    Lymphocyte % 18.0 (*)     All other components within normal limits   LIPASE - Normal   URINALYSIS W/ MICROSCOPIC IF INDICATED (NO CULTURE) - Normal    Narrative:     Urine microscopic not indicated.   SINGLE HS TROPONIN T - Normal    Narrative:     High Sensitive Troponin T Reference Range:  <14.0 ng/L- Negative Female for AMI  <22.0 ng/L- Negative Male for AMI  >=14 - Abnormal Female indicating possible myocardial injury.  >=22 - Abnormal Male indicating possible myocardial injury.   Clinicians would have to utilize clinical acumen, EKG, Troponin, and serial changes to determine if it is an Acute Myocardial Infarction " or myocardial injury due to an underlying chronic condition.        COMPREHENSIVE METABOLIC PANEL    Narrative:     GFR Normal >60  Chronic Kidney Disease <60  Kidney Failure <15     CBC AND DIFFERENTIAL    Narrative:     The following orders were created for panel order CBC & Differential.  Procedure                               Abnormality         Status                     ---------                               -----------         ------                     CBC Auto Differential[[id number]]        Abnormal            Final result                 Please view results for these tests on the individual orders.   EXTRA TUBES    Narrative:     The following orders were created for panel order Extra Tubes.  Procedure                               Abnormality         Status                     ---------                               -----------         ------                     Light Blue Top[[id number]]                                   Final result                 Please view results for these tests on the individual orders.   LIGHT BLUE TOP     Medications   sodium chloride 0.9 % flush 10 mL (has no administration in time range)   tiZANidine (ZANAFLEX) tablet 4 mg (has no administration in time range)   ketorolac (TORADOL) injection 15 mg (15 mg Intravenous Given 4/5/24 1916)   sodium chloride 0.9 % bolus 500 mL (500 mL Intravenous New Bag 4/5/24 1918)     No radiology results for the last day                                         Medical Decision Making  Patient presented with above complaints, noted physical exam was obtained she was placed on a cardiac monitor EKG was obtained and noted sinus rhythm EKG that acute abnormality, troponin within normal limits, heart score low.  CBC noted no leukocytosis or anemia, CMP notes no acute renal insufficiency liver enzymes within normal limits lipase within normal limits ruling out concern pancreatitis.  Urinalysis negative for urinary tract infection also negative for  hematuria no concern for ureterolithiasis.  As patient has no reproducible abdominal discomfort imaging was considered but not did feel to be warranted at this time.  As the pain is localized to her left flank and is not worse with eating and she has no associated nausea and vomiting feel that cholecystitis is unlikely with presentation.  After further discussion with the patient she states that she has been in the hospital around-the-clock with her mother and has been sitting in uncomfortable chairs her pain is reproducible with sitting too long or certain movements.  We discussed treatment with muscle relaxers and anti-inflammatories as well as nonpharmacological treatment of symptoms as her presentation of left flank pain is likely related to her musculoskeletal discomfort.  Advised to follow-up with PCP.  Patient gave verbal understanding denied further questions or complaints.    HEART Pathway for Early Discharge in Acute Chest Pain - MDCalc  Calculated on Apr 05 2024 7:38 PM  0 points -> HEART Pathway Score  Low risk -> 0.9-1.7% 30-day MACE Repeat troponin at 3 hours and if negative, discharge home with outpatient follow-up.      Chart review: 11/20/2024 patient was seen outpatient at her PCP related to otitis media and URI.      Note Disclaimer: At [facility], we believe that sharing information builds trust and better  relationships. You are receiving this note because you recently visited [facility]. It is possible you will see health information before a provider has talked with you about it. This kind of information can be easy to misunderstand. To help you fully understand what it means for your health, we urge you to discuss this note with your provider.       Part of this note may be an electronic transcription/translation of spoken language to printed text using the Dragon Dictation System.    Appropriate PPE worn during exam.    Amount and/or Complexity of Data Reviewed  External Data  Reviewed: notes.  Labs: ordered. Decision-making details documented in ED Course.  Radiology: ordered and independent interpretation performed. Decision-making details documented in ED Course.  ECG/medicine tests: ordered.        Final diagnoses:   Chest pain, unspecified type   Left-sided low back pain without sciatica, unspecified chronicity       ED Disposition  ED Disposition       ED Disposition   Discharge    Condition   Stable    Comment   --               Favian Dueñas, APRN  [address] [address] [phone]               Medication List        New Prescriptions      TiZANidine 2 MG capsule  Commonly known as: Zanaflex  Take 1 capsule by mouth 3 (Three) Times a Day As Needed for Muscle Spasms.               Where to Get Your Medications        These medications were sent to [facility] [address] [place] [address], [place] - [address] - [phone]  - [phone]   [address] [place] [address]      Phone: [phone]   TiZANidine 2 MG capsule            Deanna Aviles, APRN  04/05/24 2001

## 2024-04-05 NOTE — DISCHARGE INSTRUCTIONS
Prescription for Zanaflex was sent to your preferred pharmacy use over-the-counter ibuprofen 600 mg every 8 hours as needed    Can also use over-the-counter pain relieving creams and patches.  Use ice 20 minutes at a time 7 times a day    Follow-up with PCP as needed    Return to the ER for new or worsening symptoms

## 2024-04-07 LAB
QT INTERVAL: 375 MS
QTC INTERVAL: 424 MS

## 2024-05-22 ENCOUNTER — TELEPHONE (OUTPATIENT)
Dept: FAMILY MEDICINE CLINIC | Facility: CLINIC | Age: 29
End: 2024-05-22
Payer: MEDICAID

## 2024-05-22 DIAGNOSIS — J45.20 MILD INTERMITTENT ASTHMA WITHOUT COMPLICATION: ICD-10-CM

## 2024-05-22 DIAGNOSIS — F41.1 GAD (GENERALIZED ANXIETY DISORDER): ICD-10-CM

## 2024-05-22 DIAGNOSIS — Z76.0 MEDICATION REFILL: ICD-10-CM

## 2024-05-22 DIAGNOSIS — D50.8 IRON DEFICIENCY ANEMIA SECONDARY TO INADEQUATE DIETARY IRON INTAKE: ICD-10-CM

## 2024-05-22 DIAGNOSIS — F41.0 PANIC ATTACKS: ICD-10-CM

## 2024-05-22 RX ORDER — UREA 10 %
1 LOTION (ML) TOPICAL
Qty: 90 TABLET | Refills: 0 | Status: SHIPPED | OUTPATIENT
Start: 2024-05-22

## 2024-05-22 RX ORDER — ALPRAZOLAM 0.5 MG/1
0.5 TABLET ORAL EVERY 12 HOURS
Qty: 60 TABLET | Refills: 0 | Status: SHIPPED | OUTPATIENT
Start: 2024-05-22

## 2024-05-22 RX ORDER — ALBUTEROL SULFATE 90 UG/1
2 AEROSOL, METERED RESPIRATORY (INHALATION) EVERY 4 HOURS PRN
Qty: 18 G | Refills: 2 | Status: SHIPPED | OUTPATIENT
Start: 2024-05-22

## 2024-05-22 NOTE — TELEPHONE ENCOUNTER
Rx Refill Note  Requested Prescriptions     Pending Prescriptions Disp Refills    ferrous sulfate 325 (65 FE) MG EC tablet 90 tablet 0     Sig: Take 1 tablet by mouth Daily With Breakfast.    ALPRAZolam (XANAX) 0.5 MG tablet 60 tablet 0     Sig: Take 1 tablet by mouth Every 12 (Twelve) Hours.    albuterol sulfate  (90 Base) MCG/ACT inhaler 18 g 2     Sig: Inhale 2 puffs Every 4 (Four) Hours As Needed for Wheezing.      Last office visit with prescribing clinician: 3/11/2024   Last telemedicine visit with prescribing clinician: Visit date not found   Next office visit with prescribing clinician: 5/22/2024       CS-AGREEMENT SIGNED 02/14/2023  LAST UDS  08/24/2023    REFERRAL/PRE-AUTH MRN - SCAN - INSPECT / [facility]/ 05/22/2024 (05/22/2024)       Becki Church MA  05/22/24, 14:33 EDT

## 2024-05-22 NOTE — TELEPHONE ENCOUNTER
Caller: Deanna Bah    Relationship: Self    Best call back number: [phone]     What medication are you requesting: PAIN MEDICATION    What are your current symptoms: CYST ON KIDNEYS    If a prescription is needed, what is your preferred pharmacy and phone number: [facility] [address] [place] [address] - [address] [facility] [phone] [facility] [phone]      Additional notes:

## 2024-05-22 NOTE — TELEPHONE ENCOUNTER
Caller: Deanna Bah    Relationship: Self    Best call back number: [phone]     Requested Prescriptions:   Requested Prescriptions     Pending Prescriptions Disp Refills    ferrous sulfate 325 (65 FE) MG EC tablet 90 tablet 0     Sig: Take 1 tablet by mouth Daily With Breakfast.    ALPRAZolam (XANAX) 0.5 MG tablet 60 tablet 0     Sig: Take 1 tablet by mouth Every 12 (Twelve) Hours.    albuterol sulfate  (90 Base) MCG/ACT inhaler     Pharmacy where request should be sent: [place] [phone] [place] [phone]      Last office visit with prescribing clinician: 3/11/2024   Last telemedicine visit with prescribing clinician: Visit date not found   Next office visit with prescribing clinician: 6/13/2024     Does the patient have less than a 3 day supply:  [x] Yes  [] No    Oliver To   05/22/24 12:01 EDT

## 2024-05-23 ENCOUNTER — PRIOR AUTHORIZATION (OUTPATIENT)
Dept: FAMILY MEDICINE CLINIC | Facility: CLINIC | Age: 29
End: 2024-05-23
Payer: MEDICAID

## 2024-05-23 NOTE — TELEPHONE ENCOUNTER
Deanna Bah (Key: [id number])  PA Case ID #: [id number]  Rx #: [id number]  Need Help? Call us at [phone]  Status  sent iconSent to Plan today  Drug  ALPRAZolam 0.5MG tablets  ePA cloud logo  Form  [employer] ePA Form 2017 NCPDP  Original Claim Info  75

## 2024-05-24 NOTE — TELEPHONE ENCOUNTER
Called and s/w patient. Message relayed. Patient voiced understanding. Requesting referral as she did not care for Dr. Vasquez. Please send to [facility].

## 2024-06-10 ENCOUNTER — APPOINTMENT (OUTPATIENT)
Dept: GENERAL RADIOLOGY | Facility: HOSPITAL | Age: 29
End: 2024-06-10
Payer: MEDICAID

## 2024-06-10 ENCOUNTER — HOSPITAL ENCOUNTER (OUTPATIENT)
Facility: HOSPITAL | Age: 29
Discharge: HOME | End: 2024-06-10
Attending: EMERGENCY MEDICINE
Payer: MEDICAID

## 2024-06-10 VITALS
BODY MASS INDEX: 32.31 KG/M2 | OXYGEN SATURATION: 99 % | WEIGHT: 205.9 LBS | SYSTOLIC BLOOD PRESSURE: 137 MMHG | DIASTOLIC BLOOD PRESSURE: 90 MMHG | RESPIRATION RATE: 16 BRPM | HEIGHT: 67 IN | HEART RATE: 100 BPM | TEMPERATURE: 97.7 F

## 2024-06-10 DIAGNOSIS — S69.91XA INJURY OF RIGHT WRIST, INITIAL ENCOUNTER: ICD-10-CM

## 2024-06-10 DIAGNOSIS — M77.8 RIGHT WRIST TENDINITIS: Primary | ICD-10-CM

## 2024-06-10 PROCEDURE — 73110 X-RAY EXAM OF WRIST: CPT

## 2024-06-10 PROCEDURE — G0463 HOSPITAL OUTPT CLINIC VISIT: HCPCS | Performed by: PHYSICIAN ASSISTANT

## 2024-06-10 PROCEDURE — 99204 OFFICE O/P NEW MOD 45 MIN: CPT | Performed by: PHYSICIAN ASSISTANT

## 2024-06-10 RX ORDER — IBUPROFEN 400 MG/1
800 TABLET ORAL ONCE
Status: COMPLETED | OUTPATIENT
Start: 2024-06-10 | End: 2024-06-10

## 2024-06-10 RX ADMIN — IBUPROFEN 800 MG: 400 TABLET, FILM COATED ORAL at 11:34

## 2024-06-10 NOTE — Clinical Note
[facility] [place] [id number] [place] [address] [place] [address]  Phone: [phone]    Deanna Bah was seen and treated in our emergency department on 6/10/2024.  She may return to work on 06/13/2024.         Thank you for choosing [facility].    Demetrio Rocha III, PA

## 2024-06-10 NOTE — DISCHARGE INSTRUCTIONS
Read the handout provided for directions on supplemental therapy.  Take the medication prescribed.  Recommend follow-up closely with the orthopedist about the symptoms not dramatically improve over the course of the next week.  Return to the ER with any further concerns, should your condition change/worsen, or should you develop a fever.
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
## 2024-06-12 ENCOUNTER — HOSPITAL ENCOUNTER (EMERGENCY)
Facility: HOSPITAL | Age: 29
Discharge: HOME | End: 2024-06-12
Payer: MEDICAID

## 2024-06-12 VITALS
BODY MASS INDEX: 32.49 KG/M2 | RESPIRATION RATE: 18 BRPM | HEIGHT: 67 IN | OXYGEN SATURATION: 98 % | SYSTOLIC BLOOD PRESSURE: 122 MMHG | DIASTOLIC BLOOD PRESSURE: 74 MMHG | WEIGHT: 207.01 LBS | TEMPERATURE: 98.2 F | HEART RATE: 77 BPM

## 2024-06-12 DIAGNOSIS — M25.531 WRIST PAIN, ACUTE, RIGHT: ICD-10-CM

## 2024-06-12 DIAGNOSIS — S63.501S WRIST SPRAIN, RIGHT, SEQUELA: Primary | ICD-10-CM

## 2024-06-12 PROCEDURE — 25010000002 KETOROLAC TROMETHAMINE PER 15 MG

## 2024-06-12 PROCEDURE — 96372 THER/PROPH/DIAG INJ SC/IM: CPT

## 2024-06-12 PROCEDURE — 99283 EMERGENCY DEPT VISIT LOW MDM: CPT

## 2024-06-12 RX ORDER — METHOCARBAMOL 750 MG/1
750 TABLET, FILM COATED ORAL 3 TIMES DAILY PRN
Qty: 21 TABLET | Refills: 0 | Status: SHIPPED | OUTPATIENT
Start: 2024-06-12 | End: 2024-06-19

## 2024-06-12 RX ORDER — METHOCARBAMOL 750 MG/1
750 TABLET, FILM COATED ORAL ONCE
Status: COMPLETED | OUTPATIENT
Start: 2024-06-12 | End: 2024-06-12

## 2024-06-12 RX ORDER — NAPROXEN 500 MG/1
500 TABLET ORAL 2 TIMES DAILY PRN
Qty: 14 TABLET | Refills: 0 | Status: SHIPPED | OUTPATIENT
Start: 2024-06-12 | End: 2024-06-19

## 2024-06-12 RX ORDER — KETOROLAC TROMETHAMINE 30 MG/ML
30 INJECTION, SOLUTION INTRAMUSCULAR; INTRAVENOUS ONCE
Status: COMPLETED | OUTPATIENT
Start: 2024-06-12 | End: 2024-06-12

## 2024-06-12 RX ADMIN — KETOROLAC TROMETHAMINE 30 MG: 30 INJECTION, SOLUTION INTRAMUSCULAR at 22:44

## 2024-06-12 RX ADMIN — METHOCARBAMOL 750 MG: 750 TABLET ORAL at 22:44
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
## 2024-06-13 NOTE — DISCHARGE INSTRUCTIONS
Wear splint as needed for comfort and support.  Alternate use of ice and heat for 20 minutes at a time several times a day.  Take splint off several times daily and practice range of motion exercises.  Take medications as needed.    Follow-up with hand specialist for further evaluation and management of wrist pain.  Follow-up with primary care provider as needed.    Return to the ER for new or worsening symptoms.

## 2024-06-14 ENCOUNTER — OFFICE VISIT (OUTPATIENT)
Dept: ORTHOPEDIC SURGERY | Facility: CLINIC | Age: 29
End: 2024-06-14
Payer: MEDICAID

## 2024-06-14 VITALS — HEIGHT: 67 IN | OXYGEN SATURATION: 99 % | HEART RATE: 80 BPM | BODY MASS INDEX: 32.49 KG/M2 | WEIGHT: 207 LBS

## 2024-06-14 DIAGNOSIS — M25.531 ACUTE PAIN OF RIGHT WRIST: Primary | ICD-10-CM

## 2024-06-14 DIAGNOSIS — M25.539 PAIN OF RADIAL SIDE OF WRIST: ICD-10-CM
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
## 2024-07-03 ENCOUNTER — TELEPHONE (OUTPATIENT)
Dept: FAMILY MEDICINE CLINIC | Facility: CLINIC | Age: 29
End: 2024-07-03
Payer: MEDICAID

## 2024-07-03 NOTE — TELEPHONE ENCOUNTER
Caller: Deanna Bah    Relationship: Self    Best call back number:     Goodman Deanna BUSH (Self) [phone] (Mobile)       What orders are you requesting (i.e. lab or imaging): LABS-     SHE HAS BEEN TAKING PREGNANCY TEST, WHICH COME BACK NEGATIVE     FEELS LIKE HER STOMACH IS STRETCHING AND SOME NAUSEA    In what timeframe would the patient need to come in: ASAP     Where will you receive your lab/imaging services: OFFICE     [facility] APPT IS 15TH

## 2024-07-05 NOTE — TELEPHONE ENCOUNTER
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     [facility] no show was policy was explained to the pt, that after the 3rd no show she could potentially be dismissed from the practice. The pt voiced understanding of the information given.

## 2024-08-14 DIAGNOSIS — Z76.0 MEDICATION REFILL: ICD-10-CM

## 2024-08-14 DIAGNOSIS — E55.9 VITAMIN D DEFICIENCY: ICD-10-CM
